# Patient Record
Sex: MALE | Race: WHITE
[De-identification: names, ages, dates, MRNs, and addresses within clinical notes are randomized per-mention and may not be internally consistent; named-entity substitution may affect disease eponyms.]

---

## 2018-06-04 ENCOUNTER — HOSPITAL ENCOUNTER (INPATIENT)
Dept: HOSPITAL 83 - ED | Age: 78
LOS: 2 days | Discharge: HOME HEALTH | DRG: 871 | End: 2018-06-06
Attending: INTERNAL MEDICINE | Admitting: INTERNAL MEDICINE
Payer: COMMERCIAL

## 2018-06-04 VITALS — DIASTOLIC BLOOD PRESSURE: 98 MMHG

## 2018-06-04 VITALS — BODY MASS INDEX: 29.59 KG/M2 | WEIGHT: 218.44 LBS | HEIGHT: 72 IN

## 2018-06-04 VITALS — DIASTOLIC BLOOD PRESSURE: 65 MMHG | SYSTOLIC BLOOD PRESSURE: 128 MMHG

## 2018-06-04 VITALS — DIASTOLIC BLOOD PRESSURE: 57 MMHG

## 2018-06-04 VITALS — DIASTOLIC BLOOD PRESSURE: 58 MMHG | SYSTOLIC BLOOD PRESSURE: 95 MMHG

## 2018-06-04 VITALS — DIASTOLIC BLOOD PRESSURE: 58 MMHG

## 2018-06-04 VITALS — DIASTOLIC BLOOD PRESSURE: 85 MMHG

## 2018-06-04 VITALS — DIASTOLIC BLOOD PRESSURE: 68 MMHG

## 2018-06-04 VITALS — DIASTOLIC BLOOD PRESSURE: 65 MMHG | SYSTOLIC BLOOD PRESSURE: 116 MMHG

## 2018-06-04 DIAGNOSIS — N17.1: ICD-10-CM

## 2018-06-04 DIAGNOSIS — L03.113: ICD-10-CM

## 2018-06-04 DIAGNOSIS — G93.41: ICD-10-CM

## 2018-06-04 DIAGNOSIS — E83.51: ICD-10-CM

## 2018-06-04 DIAGNOSIS — Z79.82: ICD-10-CM

## 2018-06-04 DIAGNOSIS — K76.0: ICD-10-CM

## 2018-06-04 DIAGNOSIS — A41.9: Primary | ICD-10-CM

## 2018-06-04 DIAGNOSIS — E78.1: ICD-10-CM

## 2018-06-04 DIAGNOSIS — E87.6: ICD-10-CM

## 2018-06-04 DIAGNOSIS — E83.39: ICD-10-CM

## 2018-06-04 DIAGNOSIS — R70.0: ICD-10-CM

## 2018-06-04 DIAGNOSIS — R74.0: ICD-10-CM

## 2018-06-04 DIAGNOSIS — E11.9: ICD-10-CM

## 2018-06-04 DIAGNOSIS — K76.9: ICD-10-CM

## 2018-06-04 DIAGNOSIS — E87.1: ICD-10-CM

## 2018-06-04 DIAGNOSIS — E44.0: ICD-10-CM

## 2018-06-04 DIAGNOSIS — Z79.899: ICD-10-CM

## 2018-06-04 DIAGNOSIS — E83.41: ICD-10-CM

## 2018-06-04 LAB
ALBUMIN SERPL-MCNC: 3.1 GM/DL (ref 3.1–4.5)
ALP SERPL-CCNC: 63 U/L (ref 45–117)
ALT SERPL W P-5'-P-CCNC: 45 U/L (ref 12–78)
APPEARANCE UR: (no result)
APTT PPP: 26.2 SECONDS (ref 20.8–31.5)
AST SERPL-CCNC: 53 IU/L (ref 3–35)
BASOPHILS # BLD AUTO: 0.1 10*3/UL (ref 0–0.1)
BASOPHILS NFR BLD AUTO: 0.8 % (ref 0–1)
BILIRUB UR QL STRIP: NEGATIVE
BUN SERPL-MCNC: 17 MG/DL (ref 7–24)
CHLORIDE SERPL-SCNC: 100 MMOL/L (ref 98–107)
COLOR UR: YELLOW
CREAT SERPL-MCNC: 0.99 MG/DL (ref 0.7–1.3)
EOSINOPHIL # BLD AUTO: 0 10*3/UL (ref 0–0.4)
EOSINOPHIL # BLD AUTO: 0.2 % (ref 1–4)
EPI CELLS #/AREA URNS HPF: (no result) /[HPF]
ERYTHROCYTE [DISTWIDTH] IN BLOOD BY AUTOMATED COUNT: 12.6 % (ref 0–14.5)
GLUCOSE UR QL: (no result)
HCT VFR BLD AUTO: 42.1 % (ref 42–52)
HGB BLD-MCNC: 14.4 G/DL (ref 14–18)
HGB UR QL STRIP: (no result)
INR BLD: 1.1 (ref 2–3.5)
KETONES UR QL STRIP: (no result)
LEUKOCYTE ESTERASE UR QL STRIP: NEGATIVE
LYMPHOCYTES # BLD AUTO: 0.6 10*3/UL (ref 1.3–4.4)
LYMPHOCYTES NFR BLD AUTO: 9.5 % (ref 27–41)
MCH RBC QN AUTO: 33.5 PG (ref 27–31)
MCHC RBC AUTO-ENTMCNC: 34.2 G/DL (ref 33–37)
MCV RBC AUTO: 97.9 FL (ref 80–94)
MONOCYTES # BLD AUTO: 0.6 10*3/UL (ref 0.1–1)
MONOCYTES NFR BLD MANUAL: 10.1 % (ref 3–9)
NEUT #: 5 10*3/UL (ref 2.3–7.9)
NEUT %: 79.1 % (ref 47–73)
NITRITE UR QL STRIP: NEGATIVE
NRBC BLD QL AUTO: 0 % (ref 0–0)
PH UR STRIP: 6 [PH] (ref 5–9)
PLATELET # BLD AUTO: 135 10*3/UL (ref 130–400)
PMV BLD AUTO: 10.6 FL (ref 9.6–12.3)
POTASSIUM SERPL-SCNC: 3.8 MMOL/L (ref 3.5–5.1)
PROT SERPL-MCNC: 6.8 GM/DL (ref 6.4–8.2)
RBC # BLD AUTO: 4.3 10*6/UL (ref 4.5–5.9)
RBC #/AREA URNS HPF: (no result) RBC/HPF (ref 0–2)
SODIUM SERPL-SCNC: 133 MMOL/L (ref 136–145)
SP GR UR: 1.02 (ref 1–1.03)
TROPONIN I SERPL-MCNC: < 0.015 NG/ML (ref ?–0.04)
UROBILINOGEN UR STRIP-MCNC: 2 E.U./DL (ref 0.2–1)
WBC #/AREA URNS HPF: (no result) WBC/HPF (ref 0–5)
WBC NRBC COR # BLD AUTO: 6.3 10*3/UL (ref 4.8–10.8)

## 2018-06-05 VITALS — DIASTOLIC BLOOD PRESSURE: 70 MMHG | SYSTOLIC BLOOD PRESSURE: 120 MMHG

## 2018-06-05 VITALS — DIASTOLIC BLOOD PRESSURE: 74 MMHG

## 2018-06-05 VITALS — DIASTOLIC BLOOD PRESSURE: 67 MMHG | SYSTOLIC BLOOD PRESSURE: 120 MMHG

## 2018-06-05 VITALS — DIASTOLIC BLOOD PRESSURE: 61 MMHG

## 2018-06-05 VITALS — DIASTOLIC BLOOD PRESSURE: 56 MMHG

## 2018-06-05 VITALS — DIASTOLIC BLOOD PRESSURE: 65 MMHG

## 2018-06-05 LAB
25(OH)D3 SERPL-MCNC: 25.2 NG/ML (ref 30–100)
ALBUMIN SERPL-MCNC: 3 GM/DL (ref 3.1–4.5)
ALP SERPL-CCNC: 84 U/L (ref 45–117)
ALT SERPL W P-5'-P-CCNC: 76 U/L (ref 12–78)
AST SERPL-CCNC: 112 IU/L (ref 3–35)
BASOPHILS # BLD AUTO: 0 10*3/UL (ref 0–0.1)
BASOPHILS NFR BLD AUTO: 0.5 % (ref 0–1)
BUN SERPL-MCNC: 19 MG/DL (ref 7–24)
CHLORIDE SERPL-SCNC: 98 MMOL/L (ref 98–107)
CHOLEST SERPL-MCNC: 82 MG/DL (ref ?–200)
CREAT SERPL-MCNC: 1.32 MG/DL (ref 0.7–1.3)
EOSINOPHIL # BLD AUTO: 0 10*3/UL (ref 0–0.4)
EOSINOPHIL # BLD AUTO: 0.3 % (ref 1–4)
ERYTHROCYTE [DISTWIDTH] IN BLOOD BY AUTOMATED COUNT: 12.7 % (ref 0–14.5)
HCT VFR BLD AUTO: 46.8 % (ref 42–52)
HDLC SERPL-MCNC: 17 MG/DL (ref 40–60)
HGB BLD-MCNC: 15.7 G/DL (ref 14–18)
LDLC SERPL DIRECT ASSAY-MCNC: 31 MG/DL (ref 9–159)
LYMPHOCYTES # BLD AUTO: 0.4 10*3/UL (ref 1.3–4.4)
LYMPHOCYTES NFR BLD AUTO: 6.5 % (ref 27–41)
MCH RBC QN AUTO: 33.8 PG (ref 27–31)
MCHC RBC AUTO-ENTMCNC: 33.5 G/DL (ref 33–37)
MCV RBC AUTO: 100.6 FL (ref 80–94)
MONOCYTES # BLD AUTO: 0.2 10*3/UL (ref 0.1–1)
MONOCYTES NFR BLD MANUAL: 3.9 % (ref 3–9)
NEUT #: 5.5 10*3/UL (ref 2.3–7.9)
NEUT %: 88.5 % (ref 47–73)
NRBC BLD QL AUTO: 0 % (ref 0–0)
PHOSPHATE SERPL-MCNC: 2.4 MG/DL (ref 2.5–4.9)
PLATELET # BLD AUTO: 124 10*3/UL (ref 130–400)
PMV BLD AUTO: 11 FL (ref 9.6–12.3)
POTASSIUM SERPL-SCNC: 3.2 MMOL/L (ref 3.5–5.1)
PROT SERPL-MCNC: 7.1 GM/DL (ref 6.4–8.2)
RBC # BLD AUTO: 4.65 10*6/UL (ref 4.5–5.9)
SODIUM SERPL-SCNC: 135 MMOL/L (ref 136–145)
T4 FREE SERPL-MCNC: 1.09 NG/DL (ref 0.76–1.46)
TRIGL SERPL-MCNC: 169 MG/DL (ref ?–150)
TSH SERPL DL<=0.005 MIU/L-ACNC: 1.91 UIU/ML (ref 0.36–4.75)
VITAMIN B12: 754 PG/ML (ref 247–911)
VLDLC SERPL CALC-MCNC: 34 MG/DL (ref 6–40)
WBC NRBC COR # BLD AUTO: 6.2 10*3/UL (ref 4.8–10.8)

## 2018-06-06 VITALS — DIASTOLIC BLOOD PRESSURE: 79 MMHG

## 2018-06-06 VITALS — SYSTOLIC BLOOD PRESSURE: 112 MMHG | DIASTOLIC BLOOD PRESSURE: 73 MMHG

## 2018-06-06 LAB
ALBUMIN SERPL-MCNC: 2.5 GM/DL (ref 3.1–4.5)
ALP SERPL-CCNC: 95 U/L (ref 45–117)
ALT SERPL W P-5'-P-CCNC: 80 U/L (ref 12–78)
AST SERPL-CCNC: 100 IU/L (ref 3–35)
BASOPHILS # BLD AUTO: 0 10*3/UL (ref 0–0.1)
BASOPHILS NFR BLD AUTO: 0.7 % (ref 0–1)
BUN SERPL-MCNC: 16 MG/DL (ref 7–24)
CHLORIDE SERPL-SCNC: 105 MMOL/L (ref 98–107)
CREAT SERPL-MCNC: 0.82 MG/DL (ref 0.7–1.3)
EOSINOPHIL # BLD AUTO: 0.1 10*3/UL (ref 0–0.4)
EOSINOPHIL # BLD AUTO: 1.4 % (ref 1–4)
ERYTHROCYTE [DISTWIDTH] IN BLOOD BY AUTOMATED COUNT: 13.1 % (ref 0–14.5)
HCT VFR BLD AUTO: 37.8 % (ref 42–52)
HGB BLD-MCNC: 12.7 G/DL (ref 14–18)
LYMPHOCYTES # BLD AUTO: 1 10*3/UL (ref 1.3–4.4)
LYMPHOCYTES NFR BLD AUTO: 22.7 % (ref 27–41)
MCH RBC QN AUTO: 33.3 PG (ref 27–31)
MCHC RBC AUTO-ENTMCNC: 33.6 G/DL (ref 33–37)
MCV RBC AUTO: 99.2 FL (ref 80–94)
MONOCYTES # BLD AUTO: 0.4 10*3/UL (ref 0.1–1)
MONOCYTES NFR BLD MANUAL: 9.5 % (ref 3–9)
NEUT #: 2.8 10*3/UL (ref 2.3–7.9)
NEUT %: 65.5 % (ref 47–73)
NRBC BLD QL AUTO: 0 10*3/UL (ref 0–0)
PLATELET # BLD AUTO: 136 10*3/UL (ref 130–400)
PMV BLD AUTO: 11.5 FL (ref 9.6–12.3)
POTASSIUM SERPL-SCNC: 3.7 MMOL/L (ref 3.5–5.1)
PROT SERPL-MCNC: 6 GM/DL (ref 6.4–8.2)
RBC # BLD AUTO: 3.81 10*6/UL (ref 4.5–5.9)
SODIUM SERPL-SCNC: 137 MMOL/L (ref 136–145)
WBC NRBC COR # BLD AUTO: 4.2 10*3/UL (ref 4.8–10.8)

## 2019-04-08 ENCOUNTER — HOSPITAL ENCOUNTER (OUTPATIENT)
Dept: HOSPITAL 83 - RAD | Age: 79
Discharge: HOME | End: 2019-04-08
Attending: FAMILY MEDICINE
Payer: COMMERCIAL

## 2019-04-08 DIAGNOSIS — M16.12: Primary | ICD-10-CM

## 2019-04-19 ENCOUNTER — HOSPITAL ENCOUNTER (OUTPATIENT)
Dept: HOSPITAL 83 - MRI | Age: 79
Discharge: HOME | End: 2019-04-19
Attending: FAMILY MEDICINE
Payer: COMMERCIAL

## 2019-04-19 DIAGNOSIS — R60.0: ICD-10-CM

## 2019-04-19 DIAGNOSIS — M25.461: ICD-10-CM
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
## 2019-04-19 DIAGNOSIS — M17.12: ICD-10-CM

## 2019-04-19 DIAGNOSIS — M25.462: ICD-10-CM
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
## 2019-04-19 DIAGNOSIS — S76.111A: Primary | ICD-10-CM
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
## 2019-04-19 DIAGNOSIS — R26.2: ICD-10-CM

## 2019-04-30 ENCOUNTER — HOSPITAL ENCOUNTER (OUTPATIENT)
Dept: HOSPITAL 83 - ORTHO | Age: 79
Discharge: HOME | End: 2019-04-30
Attending: ORTHOPAEDIC SURGERY
Payer: COMMERCIAL

## 2019-04-30 DIAGNOSIS — M17.12: Primary | ICD-10-CM

## 2019-07-10 ENCOUNTER — HOSPITAL ENCOUNTER (OUTPATIENT)
Dept: HOSPITAL 83 - ORTHO | Age: 79
Discharge: HOME | End: 2019-07-10
Attending: ORTHOPAEDIC SURGERY
Payer: COMMERCIAL

## 2019-07-10 DIAGNOSIS — Z91.81: ICD-10-CM

## 2019-07-10 DIAGNOSIS — M17.12: Primary | ICD-10-CM

## 2019-10-01 ENCOUNTER — HOSPITAL ENCOUNTER (EMERGENCY)
Dept: HOSPITAL 83 - ED | Age: 79
Discharge: HOME | End: 2019-10-01
Payer: COMMERCIAL

## 2019-10-01 VITALS — BODY MASS INDEX: 29.12 KG/M2 | HEIGHT: 71.97 IN | WEIGHT: 215 LBS

## 2019-10-01 DIAGNOSIS — E11.9: ICD-10-CM
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
## 2019-10-01 DIAGNOSIS — Z79.899: ICD-10-CM

## 2019-10-01 DIAGNOSIS — Z79.82: ICD-10-CM
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
## 2019-10-01 DIAGNOSIS — M25.532: Primary | ICD-10-CM

## 2019-10-07 ENCOUNTER — HOSPITAL ENCOUNTER (OUTPATIENT)
Dept: HOSPITAL 83 - RAD | Age: 79
Discharge: HOME | End: 2019-10-07
Attending: FAMILY MEDICINE
Payer: COMMERCIAL

## 2019-10-07 DIAGNOSIS — M25.532: Primary | ICD-10-CM

## 2019-10-07 DIAGNOSIS — M79.645: ICD-10-CM

## 2019-10-22 ENCOUNTER — HOSPITAL ENCOUNTER (INPATIENT)
Dept: HOSPITAL 83 - ED | Age: 79
LOS: 3 days | Discharge: HOME | DRG: 579 | End: 2019-10-25
Attending: INTERNAL MEDICINE | Admitting: INTERNAL MEDICINE
Payer: COMMERCIAL

## 2019-10-22 VITALS — DIASTOLIC BLOOD PRESSURE: 74 MMHG | SYSTOLIC BLOOD PRESSURE: 148 MMHG

## 2019-10-22 VITALS — DIASTOLIC BLOOD PRESSURE: 85 MMHG | WEIGHT: 218.31 LBS | HEIGHT: 72 IN | BODY MASS INDEX: 29.57 KG/M2

## 2019-10-22 VITALS — SYSTOLIC BLOOD PRESSURE: 156 MMHG | DIASTOLIC BLOOD PRESSURE: 84 MMHG

## 2019-10-22 DIAGNOSIS — E11.621: ICD-10-CM

## 2019-10-22 DIAGNOSIS — D53.9: ICD-10-CM

## 2019-10-22 DIAGNOSIS — E11.65: ICD-10-CM

## 2019-10-22 DIAGNOSIS — L03.032: Primary | ICD-10-CM

## 2019-10-22 DIAGNOSIS — E43: ICD-10-CM

## 2019-10-22 DIAGNOSIS — E11.42: ICD-10-CM

## 2019-10-22 DIAGNOSIS — L97.529: ICD-10-CM

## 2019-10-22 DIAGNOSIS — I10: ICD-10-CM

## 2019-10-22 DIAGNOSIS — E83.41: ICD-10-CM

## 2019-10-22 LAB
ALBUMIN SERPL-MCNC: 2.4 GM/DL (ref 3.1–4.5)
ALP SERPL-CCNC: 88 U/L (ref 45–117)
ALT SERPL W P-5'-P-CCNC: 21 U/L (ref 12–78)
APTT PPP: 25.4 SECONDS (ref 20–32.1)
AST SERPL-CCNC: 17 IU/L (ref 3–35)
BASOPHILS # BLD AUTO: 0.1 10*3/UL (ref 0–0.1)
BASOPHILS NFR BLD AUTO: 1.3 % (ref 0–1)
BUN SERPL-MCNC: 17 MG/DL (ref 7–24)
CHLORIDE SERPL-SCNC: 104 MMOL/L (ref 98–107)
CREAT SERPL-MCNC: 0.9 MG/DL (ref 0.7–1.3)
EOSINOPHIL # BLD AUTO: 0.2 10*3/UL (ref 0–0.4)
EOSINOPHIL # BLD AUTO: 3 % (ref 1–4)
ERYTHROCYTE [DISTWIDTH] IN BLOOD BY AUTOMATED COUNT: 12.2 % (ref 0–14.5)
HCT VFR BLD AUTO: 42.1 % (ref 42–52)
HGB BLD-MCNC: 13.9 G/DL (ref 14–18)
INR BLD: 1 (ref 2–3.5)
LIPASE SERPL-CCNC: 118 U/L (ref 73–393)
LYMPHOCYTES # BLD AUTO: 1.8 10*3/UL (ref 1.3–4.4)
LYMPHOCYTES NFR BLD AUTO: 25.4 % (ref 27–41)
MCH RBC QN AUTO: 34 PG (ref 27–31)
MCHC RBC AUTO-ENTMCNC: 33 G/DL (ref 33–37)
MCV RBC AUTO: 102.9 FL (ref 80–94)
MONOCYTES # BLD AUTO: 0.5 10*3/UL (ref 0.1–1)
MONOCYTES NFR BLD MANUAL: 6.7 % (ref 3–9)
NEUT #: 4.4 10*3/UL (ref 2.3–7.9)
NEUT %: 62.6 % (ref 47–73)
NRBC BLD QL AUTO: 0 10*3/UL (ref 0–0)
PLATELET # BLD AUTO: 213 10*3/UL (ref 130–400)
PMV BLD AUTO: 9.7 FL (ref 9.6–12.3)
POTASSIUM SERPL-SCNC: 4 MMOL/L (ref 3.5–5.1)
PROT SERPL-MCNC: 6.9 GM/DL (ref 6.4–8.2)
RBC # BLD AUTO: 4.09 10*6/UL (ref 4.5–5.9)
SODIUM SERPL-SCNC: 136 MMOL/L (ref 136–145)
TROPONIN I SERPL-MCNC: < 0.015 NG/ML (ref ?–0.04)
WBC NRBC COR # BLD AUTO: 7 10*3/UL (ref 4.8–10.8)

## 2019-10-22 NOTE — NUR
DR. CHA'S ANSWERING SERVICE NOTIFIED OF CONSULT.
PODIATRY AWARE OF CONSULT, HAS SEEN THE PATIENT IN THE ED, AND HAS ENTERED
ORDERS FOR SURGERY TO LEFT TOE THURSDAY (I & D)

## 2019-10-22 NOTE — NUR
2000 ZOSYN NOT AVAILABLE FROM PHARMACY. SPOKE WITH PHARMACIST MULTIPLE TIMES.
STATES HE WILL GET IT UP

## 2019-10-22 NOTE — NUR
Time: 1620
A 79  year old MALE admitted to 5E
under services of LUCIE HUSSEIN DO.
Pt. arrived via stretcher from
ER.  Chief complaint: RIGHT 2ND GREAT TOE REDNESS WITH DRAINING WOUND.
 
TRUDY SILVERIO

## 2019-10-23 VITALS — DIASTOLIC BLOOD PRESSURE: 84 MMHG

## 2019-10-23 VITALS — DIASTOLIC BLOOD PRESSURE: 67 MMHG | SYSTOLIC BLOOD PRESSURE: 121 MMHG

## 2019-10-23 VITALS — DIASTOLIC BLOOD PRESSURE: 59 MMHG | SYSTOLIC BLOOD PRESSURE: 103 MMHG

## 2019-10-23 VITALS — DIASTOLIC BLOOD PRESSURE: 53 MMHG

## 2019-10-23 VITALS — DIASTOLIC BLOOD PRESSURE: 77 MMHG

## 2019-10-23 LAB
25(OH)D3 SERPL-MCNC: 30 NG/ML (ref 30–100)
ALBUMIN SERPL-MCNC: 2.2 GM/DL (ref 3.1–4.5)
ALP SERPL-CCNC: 73 U/L (ref 45–117)
ALT SERPL W P-5'-P-CCNC: 21 U/L (ref 12–78)
APTT PPP: 26.3 SECONDS (ref 20–32.1)
AST SERPL-CCNC: 18 IU/L (ref 3–35)
BASOPHILS # BLD AUTO: 0.1 10*3/UL (ref 0–0.1)
BASOPHILS NFR BLD AUTO: 0.9 % (ref 0–1)
BUN SERPL-MCNC: 13 MG/DL (ref 7–24)
CHLORIDE SERPL-SCNC: 107 MMOL/L (ref 98–107)
CHOLEST SERPL-MCNC: 102 MG/DL (ref ?–200)
CREAT SERPL-MCNC: 0.94 MG/DL (ref 0.7–1.3)
EOSINOPHIL # BLD AUTO: 0.2 10*3/UL (ref 0–0.4)
EOSINOPHIL # BLD AUTO: 2.1 % (ref 1–4)
ERYTHROCYTE [DISTWIDTH] IN BLOOD BY AUTOMATED COUNT: 12 % (ref 0–14.5)
HCT VFR BLD AUTO: 39.5 % (ref 42–52)
HDLC SERPL-MCNC: 24 MG/DL (ref 40–60)
HGB BLD-MCNC: 12.9 G/DL (ref 14–18)
INR BLD: 1.1 (ref 2–3.5)
LDLC SERPL DIRECT ASSAY-MCNC: 54 MG/DL (ref 9–159)
LYMPHOCYTES # BLD AUTO: 2.2 10*3/UL (ref 1.3–4.4)
LYMPHOCYTES NFR BLD AUTO: 23.7 % (ref 27–41)
MCH RBC QN AUTO: 33.2 PG (ref 27–31)
MCHC RBC AUTO-ENTMCNC: 32.7 G/DL (ref 33–37)
MCV RBC AUTO: 101.8 FL (ref 80–94)
MONOCYTES # BLD AUTO: 0.5 10*3/UL (ref 0.1–1)
MONOCYTES NFR BLD MANUAL: 4.8 % (ref 3–9)
NEUT #: 6.4 10*3/UL (ref 2.3–7.9)
NEUT %: 68 % (ref 47–73)
NRBC BLD QL AUTO: 0 % (ref 0–0)
PHOSPHATE SERPL-MCNC: 2.9 MG/DL (ref 2.5–4.9)
PLATELET # BLD AUTO: 225 10*3/UL (ref 130–400)
PMV BLD AUTO: 9.9 FL (ref 9.6–12.3)
POTASSIUM SERPL-SCNC: 4.1 MMOL/L (ref 3.5–5.1)
PROT SERPL-MCNC: 6.3 GM/DL (ref 6.4–8.2)
RBC # BLD AUTO: 3.88 10*6/UL (ref 4.5–5.9)
SODIUM SERPL-SCNC: 138 MMOL/L (ref 136–145)
T4 FREE SERPL-MCNC: 0.85 NG/DL (ref 0.76–1.46)
TRIGL SERPL-MCNC: 122 MG/DL (ref ?–150)
TSH SERPL DL<=0.005 MIU/L-ACNC: 2.53 UIU/ML (ref 0.36–4.75)
VITAMIN B12: 369 PG/ML (ref 247–911)
VLDLC SERPL CALC-MCNC: 24 MG/DL (ref 6–40)
WBC NRBC COR # BLD AUTO: 9.3 10*3/UL (ref 4.8–10.8)

## 2019-10-23 NOTE — NUR
BOSWORTH,ARTHUR H [id number]
 
Please refer to the physician's history and physical for past medical history,
comorbid conditions, and allergies.
   Diagnosis: INFECTED OPEN WOUND   CELLULITIS OF SECOND TOE LEF
 
   Claude Score: 21,LOW OR NO RISK
 
WOUND DESCRIPTIONS:
Wound Number: 1
Location of the wound: left 2nd toe
Thickness: Full
Size: 1.1cm x 1.1cm x 0.3cm
Tunneling: none
Undermining: none
Sinus Tract: none
Presence of Exudate: Serosanguineous
Amount: Light
Color: Red, brown, yellow
Odor: None
Periwound Skin Appearance: Erythema
Wound edges: approximated
Pain (associated with wound): none at time of assessment
How does patient state this happened? pt stated this started about one month
ago while he was cutting his toenails he stated that his wife typically cuts
his toenails but she was not around and cut them and caused this area
   Surface the patient is resting on: Position Pro
 
SKIN PREVENTION RECOMMENDATION:
   1.  Pressure redistribution support surface as appropriate
   2.  Elevate heels
   3.  Remove boots/TEDS every shift and reapply
   4.  Head of bed 30 degrees as tolerated
   5.  Assess nutrition and hydration
   6.  Manage moisture
   7.  Avoid the use of containment devices while in bed
   8.  Use absorptive products on surfaces limit layers of linens on bed
   9.  Turn and reposition every 1-2 hours in bed and every 1 hour in chair as
       tolerated
   10. Weight shifts every 15 minutes while up in chair
   11. Offloading with pillows or device to keep heels elevated off bed
   12. Monitor skin at least every shift
   13. Inspect under medical devices twice a day
 
WOUND TREATMENT RECOMMENDATIONS:
Continue bactroan ointment per podiatry orders. Podiatry and ID are on consult
and patient is having procedure thursday.

## 2019-10-23 NOTE — NUR
in to talk to patient.
Patient states lives at home with wife.
There are few steps in the home.
Physician: lexie mack
Pharmacy: [facility]
[place] [facility]: none
Patient's level of ADLs: INDEPENDENT
Patient has working utilities: all working
DME: none
Follow-up physician's appointment after d/c: will be made by hospitalist nurse
director upon discharge
Does patient want to access PORTAL?: no
Discharge plan discussed with patient and wife, he lives at home with wife, is
independent in adls and ambulation, drives, he states he would like to return
home when medically stable and will decide if he needs anything according to
the treatment performed. wife is in agreement with this, case management will
follow.
 
VALERIA GARCIA

## 2019-10-23 NOTE — NUR
PHYSICAL THERAPY
 
Physical therapy evaluation completed, 5E. Full details to follow. moderate
complexity determined after chart review and evaluation, [id number]. PT will work
on gait/transfers while maintaining NWB status, balance and safety.
Recommending home health versus SNF at discharge. Thank you
 
Marine Andrews, PT, DPT

## 2019-10-23 NOTE — NUR
Occupational therapy orders received and OT evaluation completed in full on
floor five. Patient precautions include fall risk, ww use, IV lines, and NWB L
LE. Per OT eval and POC, OT recommends SNF. If refused, home with HH SN, OT,
and PT. Patient would benefit fromc continued OT treatment to maximize
independence and safety with ADLs and functional mobility/transfers. Patient
complexity is low, [id number]. Thank you.
 
Indira Wong, OTR/L

## 2019-10-24 VITALS — DIASTOLIC BLOOD PRESSURE: 66 MMHG | SYSTOLIC BLOOD PRESSURE: 113 MMHG

## 2019-10-24 VITALS — DIASTOLIC BLOOD PRESSURE: 70 MMHG | SYSTOLIC BLOOD PRESSURE: 123 MMHG

## 2019-10-24 VITALS — SYSTOLIC BLOOD PRESSURE: 113 MMHG | DIASTOLIC BLOOD PRESSURE: 48 MMHG

## 2019-10-24 VITALS — DIASTOLIC BLOOD PRESSURE: 69 MMHG | SYSTOLIC BLOOD PRESSURE: 113 MMHG

## 2019-10-24 VITALS — DIASTOLIC BLOOD PRESSURE: 71 MMHG

## 2019-10-24 VITALS — DIASTOLIC BLOOD PRESSURE: 66 MMHG

## 2019-10-24 VITALS — DIASTOLIC BLOOD PRESSURE: 77 MMHG

## 2019-10-24 VITALS — DIASTOLIC BLOOD PRESSURE: 89 MMHG

## 2019-10-24 VITALS — DIASTOLIC BLOOD PRESSURE: 78 MMHG

## 2019-10-24 PROCEDURE — 0QBR0ZX EXCISION OF LEFT TOE PHALANX, OPEN APPROACH, DIAGNOSTIC: ICD-10-PCS | Performed by: PODIATRIST

## 2019-10-24 NOTE — NUR
case management received a script for a walker for when patient is discharged
to home, sent script to [facility],

## 2019-10-24 NOTE — NUR
case management attempts to visit with patient, he is out of room for
procedure, case management will follow

## 2019-10-24 NOTE — NUR
PHYSICAL THERAPY
 
Patient seen this am 1;1 for therapy visit and was resting supine in bed upon
therapist arrival. Patient identified by name /  and presented with
continuos IV treatment. Patient voices no c/o's pain and is NWB on L LE while
receiving safe transfer education. Patient transfers supine to sit EOB SBA x 1
and sit to stand, MIN A with use of wh walker standing support. Patient
performed several 90 degree SPT each direction, CGA, wh walker, demonstrating
a little initial difficulty, needing v/c to improve safe performance. Patient
improved technique / performance following several trials with increased
confidence and returned to supine in bed. Patient remained with call light,
tray table and telephone. Will continue per POC as tolerated, total treatment
time 14 minutes.  Aung Guzman, PTA

## 2019-10-24 NOTE — NUR
DURING ASSESSMENT, PTS LT FOOT DRESSING WAS NOTED DRY & INTACT. NO COMPLAINTS
OF PAIN FROM THE PT AT THIS TIME. PT ABLE TO MOVE FOOT/TOES WITHOUT
COMPLICATION. LEFT FOOT ELEVATED ON PILLOW AT THIS TIME. WILL
CONTINUE TO MONITOR.

## 2019-10-24 NOTE — NUR
PATIENT RELAXED AT THIS TIME, NO COMPLAINTS. DENIES PAIN. DRESSING DRY &
INTACT TO LEFT FOOT. CALL LIGHT WITHIN REACH.

## 2019-10-24 NOTE — NUR
OT NOTE
Pt was seen this A.M. 1:1 for 16 minute OT session. Upon arrival pt was supine
in bed. Pt identified by name and  and had no complaints at this time. Pt
was educated on NWB status of LLE and pt verbalized understanding. Pt
transferred supine to sit EOB with SBA. Pt then completed multiple sit to
stand transfers from bed level with Sivakumar and use of w/w. Pt was educated on
slowing down throughout due to being impulsive and increasing risk of falls.
Challenged pt's static standing tolerance needed for increased I in self care
tasks and functional transfers and for proper follow through of non weight
bearing to LLE. Throughout pt was able to tolerate aprox 2 minutes of static
standing tolerance before sitting due to fatigue. Pt was 100% compliant with
non weight bearing with constant verbal prompts to maintain. Pt had three LOB
throughout standing to the R that required Sivakumar to correct.  Attempted to
complete functional mobility to the bathroom and back and pt stated he was too
tired at this time and didn't feel comfortable with weight bearing status. Pt
then transferred sit to supine with SBA. There he was left with call light in
hand, tray table in place, and phone in reach. Continue with rec D/C plan to
SNF.
 
ANA Shay/L

## 2019-10-25 VITALS — DIASTOLIC BLOOD PRESSURE: 76 MMHG

## 2019-10-25 VITALS — SYSTOLIC BLOOD PRESSURE: 146 MMHG | DIASTOLIC BLOOD PRESSURE: 77 MMHG

## 2019-10-25 LAB
BASOPHILS # BLD AUTO: 0.1 10*3/UL (ref 0–0.1)
BASOPHILS NFR BLD AUTO: 1.1 % (ref 0–1)
EOSINOPHIL # BLD AUTO: 0.3 10*3/UL (ref 0–0.4)
EOSINOPHIL # BLD AUTO: 4.1 % (ref 1–4)
ERYTHROCYTE [DISTWIDTH] IN BLOOD BY AUTOMATED COUNT: 12.3 % (ref 0–14.5)
HCT VFR BLD AUTO: 41 % (ref 42–52)
HGB BLD-MCNC: 13.5 G/DL (ref 14–18)
LYMPHOCYTES # BLD AUTO: 2.4 10*3/UL (ref 1.3–4.4)
LYMPHOCYTES NFR BLD AUTO: 33.7 % (ref 27–41)
MCH RBC QN AUTO: 33.8 PG (ref 27–31)
MCHC RBC AUTO-ENTMCNC: 32.9 G/DL (ref 33–37)
MCV RBC AUTO: 102.8 FL (ref 80–94)
MONOCYTES # BLD AUTO: 0.6 10*3/UL (ref 0.1–1)
MONOCYTES NFR BLD MANUAL: 8.4 % (ref 3–9)
NEUT #: 3.7 10*3/UL (ref 2.3–7.9)
NEUT %: 51.6 % (ref 47–73)
NRBC BLD QL AUTO: 0 % (ref 0–0)
PLATELET # BLD AUTO: 244 10*3/UL (ref 130–400)
PMV BLD AUTO: 9.6 FL (ref 9.6–12.3)
RBC # BLD AUTO: 3.99 10*6/UL (ref 4.5–5.9)
WBC NRBC COR # BLD AUTO: 7.1 10*3/UL (ref 4.8–10.8)

## 2019-10-25 NOTE — NUR
OT NOTE
Pt was seen this A.M. 1:1 for 12 minute OT session. Upon arrival pt was supine
in bed. Pt identified by name and  and had no complaints at this time. Pt
transferred supine to sit EOB with SBA. Sit to stand then completed from the
bed level with Sivakumar and use of w/w for UE support. During rise pt had LOB to
the R that required Sivakumar to correct. Pt maintained non weight bearing status
to LLE with one verbal prompt provided. Functional mobility was then completed
to the bathroom and back with Sivakumar and use of w/w with bouts of unsteady due
to being impulsive and increasing risk of falls. Pt maintained non weight
bearing throughout mobility with 100% accuracy. Pt transferred back into bed
sit to supine with SBA. There he was left with call light in hand, tray table
in place, and wife at bedside. Continue with rec D/C plan to SNF.
 
ANA Shay/L

## 2019-10-25 NOTE — NUR
Nutritional Support Services Note: Appetite is good for meals, he is eating
100% of 1800cal diet as ordered. Will follow as needed. Marlene Farias Rdn Ld

## 2019-10-25 NOTE — NUR
PHYSICAL THERAPY CO-SIGN
 
 
  I approve of the Physical Therapy notes written above.
 
                                  NEGRO JENSEN, PT, DPT

## 2019-10-25 NOTE — NUR
OCCUPATIONAL THERAPY CO-SIGN
 
I approve of the Occupational Therapy notes written above.
SUNG BEYER OTR/BRAYDON

## 2019-10-25 NOTE — NUR
case management visits with patient, wife present, wife stated health care
solutions contacted her yesterday regarding the walker and they will deliver
it to the hospital today, also discussed with them VNA and both declined any
home services, wife stated she will be taking patient to Dr Palencia's office on
Tuesday of next week

## 2019-10-26 LAB — SPECIMEN PREPARATION: (no result)

## 2019-11-04 ENCOUNTER — HOSPITAL ENCOUNTER (OUTPATIENT)
Dept: HOSPITAL 83 - PICC | Age: 79
Discharge: HOME | End: 2019-11-04
Attending: STUDENT IN AN ORGANIZED HEALTH CARE EDUCATION/TRAINING PROGRAM
Payer: COMMERCIAL

## 2019-11-04 VITALS — SYSTOLIC BLOOD PRESSURE: 143 MMHG | DIASTOLIC BLOOD PRESSURE: 69 MMHG

## 2019-11-04 DIAGNOSIS — M86.272: Primary | ICD-10-CM

## 2019-11-04 DIAGNOSIS — Z82.49: ICD-10-CM

## 2019-11-04 DIAGNOSIS — Z83.3: ICD-10-CM

## 2020-09-20 ENCOUNTER — HOSPITAL ENCOUNTER (EMERGENCY)
Dept: HOSPITAL 83 - ED | Age: 80
Discharge: HOME | End: 2020-09-20
Payer: COMMERCIAL

## 2020-09-20 VITALS — BODY MASS INDEX: 29.12 KG/M2 | WEIGHT: 215 LBS | HEIGHT: 71.97 IN

## 2020-09-20 DIAGNOSIS — Z79.82: ICD-10-CM

## 2020-09-20 DIAGNOSIS — Z79.899: ICD-10-CM

## 2020-09-20 DIAGNOSIS — S20.212A: Primary | ICD-10-CM

## 2020-09-20 DIAGNOSIS — Z79.2: ICD-10-CM
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
## 2020-09-20 DIAGNOSIS — I10: ICD-10-CM
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
## 2020-10-04 ENCOUNTER — HOSPITAL ENCOUNTER (EMERGENCY)
Dept: HOSPITAL 83 - ED | Age: 80
Discharge: HOME | End: 2020-10-04
Payer: COMMERCIAL

## 2020-10-04 VITALS — WEIGHT: 215 LBS | HEIGHT: 70.98 IN | BODY MASS INDEX: 30.1 KG/M2

## 2020-10-04 DIAGNOSIS — R07.89: ICD-10-CM

## 2020-10-04 DIAGNOSIS — I70.0: ICD-10-CM

## 2020-10-04 DIAGNOSIS — Z79.899: ICD-10-CM

## 2020-10-04 DIAGNOSIS — I25.84: Primary | ICD-10-CM

## 2020-10-04 DIAGNOSIS — Z79.82: ICD-10-CM

## 2020-10-04 LAB
ALBUMIN SERPL-MCNC: 3.4 GM/DL (ref 3.1–4.5)
ALP SERPL-CCNC: 197 U/L (ref 45–117)
ALT SERPL W P-5'-P-CCNC: 38 U/L (ref 12–78)
APTT PPP: 25 SECONDS (ref 20–32.1)
AST SERPL-CCNC: 32 IU/L (ref 3–35)
BASOPHILS # BLD AUTO: 0.1 10*3/UL (ref 0–0.1)
BASOPHILS NFR BLD AUTO: 1.6 % (ref 0–1)
BUN SERPL-MCNC: 14 MG/DL (ref 7–24)
CHLORIDE SERPL-SCNC: 106 MMOL/L (ref 98–107)
CREAT SERPL-MCNC: 0.94 MG/DL (ref 0.7–1.3)
EOSINOPHIL # BLD AUTO: 0.2 10*3/UL (ref 0–0.4)
EOSINOPHIL # BLD AUTO: 2.5 % (ref 1–4)
ERYTHROCYTE [DISTWIDTH] IN BLOOD BY AUTOMATED COUNT: 12.2 % (ref 0–14.5)
HCT VFR BLD AUTO: 46.6 % (ref 42–52)
INR BLD: 1 (ref 2–3.5)
LYMPHOCYTES # BLD AUTO: 2.1 10*3/UL (ref 1.3–4.4)
LYMPHOCYTES NFR BLD AUTO: 27 % (ref 27–41)
MCH RBC QN AUTO: 33 PG (ref 27–31)
MCHC RBC AUTO-ENTMCNC: 32.8 G/DL (ref 33–37)
MCV RBC AUTO: 100.4 FL (ref 80–94)
MONOCYTES # BLD AUTO: 0.6 10*3/UL (ref 0.1–1)
MONOCYTES NFR BLD MANUAL: 8 % (ref 3–9)
NEUT #: 4.6 10*3/UL (ref 2.3–7.9)
NEUT %: 60.5 % (ref 47–73)
NRBC BLD QL AUTO: 0 % (ref 0–0)
PLATELET # BLD AUTO: 226 10*3/UL (ref 130–400)
PMV BLD AUTO: 10 FL (ref 9.6–12.3)
POTASSIUM SERPL-SCNC: 4.3 MMOL/L (ref 3.5–5.1)
PROT SERPL-MCNC: 7.5 GM/DL (ref 6.4–8.2)
RBC # BLD AUTO: 4.64 10*6/UL (ref 4.5–5.9)
SODIUM SERPL-SCNC: 135 MMOL/L (ref 136–145)
TROPONIN I SERPL-MCNC: < 0.015 NG/ML (ref ?–0.04)
WBC NRBC COR # BLD AUTO: 7.6 10*3/UL (ref 4.8–10.8)

## 2021-02-08 ENCOUNTER — HOSPITAL ENCOUNTER (OUTPATIENT)
Dept: HOSPITAL 83 - COVID19 | Age: 81
Discharge: HOME | End: 2021-02-08
Payer: COMMERCIAL

## 2021-02-08 DIAGNOSIS — Z01.812: Primary | ICD-10-CM

## 2021-02-08 DIAGNOSIS — Z20.822: ICD-10-CM

## 2022-01-01 ENCOUNTER — APPOINTMENT (OUTPATIENT)
Dept: CT IMAGING | Age: 82
DRG: 435 | End: 2022-01-01
Attending: FAMILY MEDICINE
Payer: MEDICARE

## 2022-01-01 ENCOUNTER — APPOINTMENT (OUTPATIENT)
Dept: NUCLEAR MEDICINE | Age: 82
DRG: 435 | End: 2022-01-01
Attending: FAMILY MEDICINE
Payer: MEDICARE

## 2022-01-01 ENCOUNTER — HOSPITAL ENCOUNTER (INPATIENT)
Age: 82
LOS: 1 days | DRG: 435 | End: 2022-08-01
Attending: FAMILY MEDICINE | Admitting: FAMILY MEDICINE
Payer: MEDICARE

## 2022-01-01 VITALS
TEMPERATURE: 97.1 F | RESPIRATION RATE: 17 BRPM | HEART RATE: 95 BPM | DIASTOLIC BLOOD PRESSURE: 54 MMHG | HEIGHT: 71 IN | WEIGHT: 150 LBS | OXYGEN SATURATION: 73 % | BODY MASS INDEX: 21 KG/M2 | SYSTOLIC BLOOD PRESSURE: 83 MMHG

## 2022-01-01 LAB
ALBUMIN SERPL-MCNC: 2.5 G/DL (ref 3.5–5.2)
ALBUMIN SERPL-MCNC: 2.9 G/DL (ref 3.5–5.2)
ALP BLD-CCNC: 396 U/L (ref 40–129)
ALP BLD-CCNC: 468 U/L (ref 40–129)
ALT SERPL-CCNC: 191 U/L (ref 0–40)
ALT SERPL-CCNC: 197 U/L (ref 0–40)
ANION GAP SERPL CALCULATED.3IONS-SCNC: 17 MMOL/L (ref 7–16)
ANION GAP SERPL CALCULATED.3IONS-SCNC: 19 MMOL/L (ref 7–16)
APTT: 112.1 SEC (ref 24.5–35.1)
APTT: 37.9 SEC (ref 24.5–35.1)
AST SERPL-CCNC: 371 U/L (ref 0–39)
AST SERPL-CCNC: 462 U/L (ref 0–39)
BASOPHILS ABSOLUTE: 0.03 E9/L (ref 0–0.2)
BASOPHILS ABSOLUTE: 0.03 E9/L (ref 0–0.2)
BASOPHILS RELATIVE PERCENT: 0.2 % (ref 0–2)
BASOPHILS RELATIVE PERCENT: 0.2 % (ref 0–2)
BILIRUB SERPL-MCNC: 4.5 MG/DL (ref 0–1.2)
BILIRUB SERPL-MCNC: 4.7 MG/DL (ref 0–1.2)
BILIRUBIN DIRECT: 3.2 MG/DL (ref 0–0.3)
BILIRUBIN, INDIRECT: 1.5 MG/DL (ref 0–1)
BUN BLDV-MCNC: 34 MG/DL (ref 6–23)
BUN BLDV-MCNC: 38 MG/DL (ref 6–23)
CALCIUM SERPL-MCNC: 8.2 MG/DL (ref 8.6–10.2)
CALCIUM SERPL-MCNC: 8.4 MG/DL (ref 8.6–10.2)
CHLORIDE BLD-SCNC: 101 MMOL/L (ref 98–107)
CHLORIDE BLD-SCNC: 105 MMOL/L (ref 98–107)
CO2: 15 MMOL/L (ref 22–29)
CO2: 21 MMOL/L (ref 22–29)
CREAT SERPL-MCNC: 1.1 MG/DL (ref 0.7–1.2)
CREAT SERPL-MCNC: 1.2 MG/DL (ref 0.7–1.2)
D DIMER: >5250 NG/ML DDU
EKG ATRIAL RATE: 142 BPM
EKG P-R INTERVAL: 166 MS
EKG Q-T INTERVAL: 266 MS
EKG QRS DURATION: 68 MS
EKG QTC CALCULATION (BAZETT): 409 MS
EKG R AXIS: -33 DEGREES
EKG T AXIS: -124 DEGREES
EKG VENTRICULAR RATE: 142 BPM
EOSINOPHILS ABSOLUTE: 0 E9/L (ref 0.05–0.5)
EOSINOPHILS ABSOLUTE: 0.03 E9/L (ref 0.05–0.5)
EOSINOPHILS RELATIVE PERCENT: 0 % (ref 0–6)
EOSINOPHILS RELATIVE PERCENT: 0.2 % (ref 0–6)
GFR AFRICAN AMERICAN: >60
GFR AFRICAN AMERICAN: >60
GFR NON-AFRICAN AMERICAN: 58 ML/MIN/1.73
GFR NON-AFRICAN AMERICAN: >60 ML/MIN/1.73
GLUCOSE BLD-MCNC: 103 MG/DL (ref 74–99)
GLUCOSE BLD-MCNC: 99 MG/DL (ref 74–99)
HCT VFR BLD CALC: 40.4 % (ref 37–54)
HCT VFR BLD CALC: 42.2 % (ref 37–54)
HEMOGLOBIN: 13.6 G/DL (ref 12.5–16.5)
HEMOGLOBIN: 14.2 G/DL (ref 12.5–16.5)
IMMATURE GRANULOCYTES #: 0.11 E9/L
IMMATURE GRANULOCYTES #: 0.13 E9/L
IMMATURE GRANULOCYTES %: 0.8 % (ref 0–5)
IMMATURE GRANULOCYTES %: 1 % (ref 0–5)
L. PNEUMOPHILA SEROGP 1 UR AG: NORMAL
LACTIC ACID: 2.9 MMOL/L (ref 0.5–2.2)
LYMPHOCYTES ABSOLUTE: 1 E9/L (ref 1.5–4)
LYMPHOCYTES ABSOLUTE: 1.82 E9/L (ref 1.5–4)
LYMPHOCYTES RELATIVE PERCENT: 13.6 % (ref 20–42)
LYMPHOCYTES RELATIVE PERCENT: 7.6 % (ref 20–42)
MCH RBC QN AUTO: 35.4 PG (ref 26–35)
MCH RBC QN AUTO: 35.9 PG (ref 26–35)
MCHC RBC AUTO-ENTMCNC: 33.6 % (ref 32–34.5)
MCHC RBC AUTO-ENTMCNC: 33.7 % (ref 32–34.5)
MCV RBC AUTO: 105.2 FL (ref 80–99.9)
MCV RBC AUTO: 106.6 FL (ref 80–99.9)
MONOCYTES ABSOLUTE: 0.72 E9/L (ref 0.1–0.95)
MONOCYTES ABSOLUTE: 0.77 E9/L (ref 0.1–0.95)
MONOCYTES RELATIVE PERCENT: 5.5 % (ref 2–12)
MONOCYTES RELATIVE PERCENT: 5.8 % (ref 2–12)
MRSA CULTURE ONLY: NORMAL
NEUTROPHILS ABSOLUTE: 10.58 E9/L (ref 1.8–7.3)
NEUTROPHILS ABSOLUTE: 11.35 E9/L (ref 1.8–7.3)
NEUTROPHILS RELATIVE PERCENT: 79.2 % (ref 43–80)
NEUTROPHILS RELATIVE PERCENT: 85.9 % (ref 43–80)
PDW BLD-RTO: 17.2 FL (ref 11.5–15)
PDW BLD-RTO: 17.4 FL (ref 11.5–15)
PLATELET # BLD: 101 E9/L (ref 130–450)
PLATELET # BLD: 110 E9/L (ref 130–450)
PMV BLD AUTO: 10.5 FL (ref 7–12)
PMV BLD AUTO: 10.9 FL (ref 7–12)
POTASSIUM REFLEX MAGNESIUM: 5 MMOL/L (ref 3.5–5)
POTASSIUM SERPL-SCNC: 5 MMOL/L (ref 3.5–5)
RBC # BLD: 3.79 E12/L (ref 3.8–5.8)
RBC # BLD: 4.01 E12/L (ref 3.8–5.8)
SODIUM BLD-SCNC: 139 MMOL/L (ref 132–146)
SODIUM BLD-SCNC: 139 MMOL/L (ref 132–146)
STREP PNEUMONIAE ANTIGEN, URINE: NORMAL
TOTAL PROTEIN: 5.2 G/DL (ref 6.4–8.3)
TOTAL PROTEIN: 5.8 G/DL (ref 6.4–8.3)
WBC # BLD: 13.2 E9/L (ref 4.5–11.5)
WBC # BLD: 13.4 E9/L (ref 4.5–11.5)

## 2022-01-01 PROCEDURE — 6360000002 HC RX W HCPCS

## 2022-01-01 PROCEDURE — 92950 HEART/LUNG RESUSCITATION CPR: CPT

## 2022-01-01 PROCEDURE — 6360000002 HC RX W HCPCS: Performed by: INTERNAL MEDICINE

## 2022-01-01 PROCEDURE — 83605 ASSAY OF LACTIC ACID: CPT

## 2022-01-01 PROCEDURE — 2580000003 HC RX 258

## 2022-01-01 PROCEDURE — 80048 BASIC METABOLIC PNL TOTAL CA: CPT

## 2022-01-01 PROCEDURE — 71275 CT ANGIOGRAPHY CHEST: CPT

## 2022-01-01 PROCEDURE — 36415 COLL VENOUS BLD VENIPUNCTURE: CPT

## 2022-01-01 PROCEDURE — 85378 FIBRIN DEGRADE SEMIQUANT: CPT

## 2022-01-01 PROCEDURE — 80053 COMPREHEN METABOLIC PANEL: CPT

## 2022-01-01 PROCEDURE — 87449 NOS EACH ORGANISM AG IA: CPT

## 2022-01-01 PROCEDURE — 93005 ELECTROCARDIOGRAM TRACING: CPT | Performed by: FAMILY MEDICINE

## 2022-01-01 PROCEDURE — 85730 THROMBOPLASTIN TIME PARTIAL: CPT

## 2022-01-01 PROCEDURE — 2500000003 HC RX 250 WO HCPCS: Performed by: INTERNAL MEDICINE

## 2022-01-01 PROCEDURE — 99222 1ST HOSP IP/OBS MODERATE 55: CPT | Performed by: INTERNAL MEDICINE

## 2022-01-01 PROCEDURE — 85025 COMPLETE CBC W/AUTO DIFF WBC: CPT

## 2022-01-01 PROCEDURE — 2500000003 HC RX 250 WO HCPCS

## 2022-01-01 PROCEDURE — 2580000003 HC RX 258: Performed by: FAMILY MEDICINE

## 2022-01-01 PROCEDURE — 6360000004 HC RX CONTRAST MEDICATION: Performed by: RADIOLOGY

## 2022-01-01 PROCEDURE — 80076 HEPATIC FUNCTION PANEL: CPT

## 2022-01-01 PROCEDURE — 6360000002 HC RX W HCPCS: Performed by: FAMILY MEDICINE

## 2022-01-01 PROCEDURE — 70450 CT HEAD/BRAIN W/O DYE: CPT

## 2022-01-01 PROCEDURE — 1200000000 HC SEMI PRIVATE

## 2022-01-01 PROCEDURE — 87081 CULTURE SCREEN ONLY: CPT

## 2022-01-01 PROCEDURE — 87088 URINE BACTERIA CULTURE: CPT

## 2022-01-01 PROCEDURE — 31500 INSERT EMERGENCY AIRWAY: CPT

## 2022-01-01 PROCEDURE — 2580000003 HC RX 258: Performed by: RADIOLOGY

## 2022-01-01 RX ORDER — ACETAMINOPHEN 325 MG/1
650 TABLET ORAL EVERY 6 HOURS PRN
Status: DISCONTINUED | OUTPATIENT
Start: 2022-01-01 | End: 2022-01-01 | Stop reason: HOSPADM

## 2022-01-01 RX ORDER — POLYETHYLENE GLYCOL 3350 17 G/17G
17 POWDER, FOR SOLUTION ORAL DAILY PRN
Status: DISCONTINUED | OUTPATIENT
Start: 2022-01-01 | End: 2022-01-01 | Stop reason: HOSPADM

## 2022-01-01 RX ORDER — EPINEPHRINE 0.1 MG/ML
SYRINGE (ML) INJECTION
Status: COMPLETED | OUTPATIENT
Start: 2022-01-01 | End: 2022-01-01

## 2022-01-01 RX ORDER — METOPROLOL TARTRATE 5 MG/5ML
5 INJECTION INTRAVENOUS EVERY 6 HOURS PRN
Status: DISCONTINUED | OUTPATIENT
Start: 2022-01-01 | End: 2022-01-01 | Stop reason: HOSPADM

## 2022-01-01 RX ORDER — HEPARIN SODIUM 1000 [USP'U]/ML
80 INJECTION, SOLUTION INTRAVENOUS; SUBCUTANEOUS PRN
Status: DISCONTINUED | OUTPATIENT
Start: 2022-01-01 | End: 2022-01-01 | Stop reason: HOSPADM

## 2022-01-01 RX ORDER — ACETAMINOPHEN 650 MG/1
650 SUPPOSITORY RECTAL EVERY 6 HOURS PRN
Status: DISCONTINUED | OUTPATIENT
Start: 2022-01-01 | End: 2022-01-01 | Stop reason: HOSPADM

## 2022-01-01 RX ORDER — ONDANSETRON 4 MG/1
4 TABLET, ORALLY DISINTEGRATING ORAL EVERY 8 HOURS PRN
Status: DISCONTINUED | OUTPATIENT
Start: 2022-01-01 | End: 2022-01-01 | Stop reason: HOSPADM

## 2022-01-01 RX ORDER — SODIUM CHLORIDE 0.9 % (FLUSH) 0.9 %
5-40 SYRINGE (ML) INJECTION PRN
Status: DISCONTINUED | OUTPATIENT
Start: 2022-01-01 | End: 2022-01-01 | Stop reason: HOSPADM

## 2022-01-01 RX ORDER — TC 99M MEDRONATE 20 MG/10ML
26.6 INJECTION, POWDER, LYOPHILIZED, FOR SOLUTION INTRAVENOUS
Status: DISCONTINUED | OUTPATIENT
Start: 2022-01-01 | End: 2022-01-01 | Stop reason: HOSPADM

## 2022-01-01 RX ORDER — ENOXAPARIN SODIUM 100 MG/ML
40 INJECTION SUBCUTANEOUS DAILY
Status: DISCONTINUED | OUTPATIENT
Start: 2022-01-01 | End: 2022-01-01

## 2022-01-01 RX ORDER — SODIUM CHLORIDE 9 MG/ML
INJECTION, SOLUTION INTRAVENOUS PRN
Status: DISCONTINUED | OUTPATIENT
Start: 2022-01-01 | End: 2022-01-01 | Stop reason: HOSPADM

## 2022-01-01 RX ORDER — HEPARIN SODIUM 10000 [USP'U]/100ML
5-30 INJECTION, SOLUTION INTRAVENOUS CONTINUOUS
Status: DISCONTINUED | OUTPATIENT
Start: 2022-01-01 | End: 2022-01-01 | Stop reason: HOSPADM

## 2022-01-01 RX ORDER — ONDANSETRON 2 MG/ML
4 INJECTION INTRAMUSCULAR; INTRAVENOUS EVERY 6 HOURS PRN
Status: DISCONTINUED | OUTPATIENT
Start: 2022-01-01 | End: 2022-01-01 | Stop reason: HOSPADM

## 2022-01-01 RX ORDER — 0.9 % SODIUM CHLORIDE 0.9 %
500 INTRAVENOUS SOLUTION INTRAVENOUS ONCE
Status: COMPLETED | OUTPATIENT
Start: 2022-01-01 | End: 2022-01-01

## 2022-01-01 RX ORDER — HEPARIN SODIUM 1000 [USP'U]/ML
40 INJECTION, SOLUTION INTRAVENOUS; SUBCUTANEOUS PRN
Status: DISCONTINUED | OUTPATIENT
Start: 2022-01-01 | End: 2022-01-01 | Stop reason: HOSPADM

## 2022-01-01 RX ORDER — CLOPIDOGREL BISULFATE 75 MG/1
75 TABLET ORAL DAILY
COMMUNITY

## 2022-01-01 RX ORDER — HEPARIN SODIUM 1000 [USP'U]/ML
80 INJECTION, SOLUTION INTRAVENOUS; SUBCUTANEOUS ONCE
Status: COMPLETED | OUTPATIENT
Start: 2022-01-01 | End: 2022-01-01

## 2022-01-01 RX ORDER — SODIUM CHLORIDE 0.9 % (FLUSH) 0.9 %
10 SYRINGE (ML) INJECTION PRN
Status: DISCONTINUED | OUTPATIENT
Start: 2022-01-01 | End: 2022-01-01 | Stop reason: HOSPADM

## 2022-01-01 RX ORDER — SODIUM CHLORIDE 0.9 % (FLUSH) 0.9 %
5-40 SYRINGE (ML) INJECTION EVERY 12 HOURS SCHEDULED
Status: DISCONTINUED | OUTPATIENT
Start: 2022-01-01 | End: 2022-01-01 | Stop reason: HOSPADM

## 2022-01-01 RX ORDER — CLOPIDOGREL BISULFATE 75 MG/1
75 TABLET ORAL DAILY
Status: DISCONTINUED | OUTPATIENT
Start: 2022-01-01 | End: 2022-01-01 | Stop reason: HOSPADM

## 2022-01-01 RX ADMIN — SODIUM CHLORIDE, PRESERVATIVE FREE 10 ML: 5 INJECTION INTRAVENOUS at 11:05

## 2022-01-01 RX ADMIN — Medication 1 MG: at 08:49

## 2022-01-01 RX ADMIN — ENOXAPARIN SODIUM 40 MG: 100 INJECTION SUBCUTANEOUS at 11:05

## 2022-01-01 RX ADMIN — HEPARIN SODIUM 5440 UNITS: 1000 INJECTION, SOLUTION INTRAVENOUS; SUBCUTANEOUS at 21:46

## 2022-01-01 RX ADMIN — SODIUM BICARBONATE 50 MEQ: 84 INJECTION, SOLUTION INTRAVENOUS at 08:51

## 2022-01-01 RX ADMIN — Medication 1 MG: at 08:52

## 2022-01-01 RX ADMIN — SODIUM CHLORIDE, PRESERVATIVE FREE 10 ML: 5 INJECTION INTRAVENOUS at 22:09

## 2022-01-01 RX ADMIN — Medication 1 MG: at 08:46

## 2022-01-01 RX ADMIN — SODIUM CHLORIDE 500 ML: 9 INJECTION, SOLUTION INTRAVENOUS at 17:14

## 2022-01-01 RX ADMIN — HEPARIN SODIUM 18 UNITS/KG/HR: 10000 INJECTION, SOLUTION INTRAVENOUS at 22:07

## 2022-01-01 RX ADMIN — Medication 10 ML: at 17:01

## 2022-01-01 RX ADMIN — IOPAMIDOL 60 ML: 755 INJECTION, SOLUTION INTRAVENOUS at 17:01

## 2022-01-01 ASSESSMENT — PAIN SCALES - GENERAL: PAINLEVEL_OUTOF10: 0

## 2022-02-12 ENCOUNTER — HOSPITAL ENCOUNTER (EMERGENCY)
Dept: HOSPITAL 83 - ED | Age: 82
Discharge: HOME | End: 2022-02-12
Payer: COMMERCIAL

## 2022-02-12 VITALS — WEIGHT: 160 LBS | BODY MASS INDEX: 21.91 KG/M2 | HEIGHT: 71.65 IN

## 2022-02-12 DIAGNOSIS — Z79.82: ICD-10-CM
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
## 2022-02-12 DIAGNOSIS — E11.9: ICD-10-CM

## 2022-02-12 DIAGNOSIS — I10: ICD-10-CM
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
## 2022-02-12 DIAGNOSIS — S22.41XA: Primary | ICD-10-CM

## 2022-02-12 DIAGNOSIS — S00.01XA: ICD-10-CM

## 2022-02-12 DIAGNOSIS — Z79.899: ICD-10-CM
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
## 2022-02-12 LAB
ALBUMIN SERPL-MCNC: 3 GM/DL (ref 3.1–4.5)
ALP SERPL-CCNC: 117 U/L (ref 45–117)
ALT SERPL W P-5'-P-CCNC: 37 U/L (ref 12–78)
AST SERPL-CCNC: 39 IU/L (ref 3–35)
BASOPHILS # BLD AUTO: 0.1 10*3/UL (ref 0–0.1)
BASOPHILS NFR BLD AUTO: 0.8 % (ref 0–1)
BUN SERPL-MCNC: 23 MG/DL (ref 7–24)
CHLORIDE SERPL-SCNC: 102 MMOL/L (ref 98–107)
CREAT SERPL-MCNC: 1.05 MG/DL (ref 0.7–1.3)
EOSINOPHIL # BLD AUTO: 0.1 10*3/UL (ref 0–0.4)
EOSINOPHIL # BLD AUTO: 0.8 % (ref 1–4)
ERYTHROCYTE [DISTWIDTH] IN BLOOD BY AUTOMATED COUNT: 12 % (ref 0–14.5)
HCT VFR BLD AUTO: 49.2 % (ref 42–52)
LYMPHOCYTES # BLD AUTO: 1.6 10*3/UL (ref 1.3–4.4)
LYMPHOCYTES NFR BLD AUTO: 15.3 % (ref 27–41)
MCH RBC QN AUTO: 33.5 PG (ref 27–31)
MCHC RBC AUTO-ENTMCNC: 34.1 G/DL (ref 33–37)
MCV RBC AUTO: 98 FL (ref 80–94)
MONOCYTES # BLD AUTO: 0.7 10*3/UL (ref 0.1–1)
MONOCYTES NFR BLD MANUAL: 6.4 % (ref 3–9)
NEUT #: 7.7 10*3/UL (ref 2.3–7.9)
NEUT %: 76.1 % (ref 47–73)
NRBC BLD QL AUTO: 0 % (ref 0–0)
PLATELET # BLD AUTO: 241 10*3/UL (ref 130–400)
PMV BLD AUTO: 10.3 FL (ref 9.6–12.3)
POTASSIUM SERPL-SCNC: 3.9 MMOL/L (ref 3.5–5.1)
PROT SERPL-MCNC: 6.8 GM/DL (ref 6.4–8.2)
RBC # BLD AUTO: 5.02 10*6/UL (ref 4.5–5.9)
SODIUM SERPL-SCNC: 136 MMOL/L (ref 136–145)
WBC NRBC COR # BLD AUTO: 10.2 10*3/UL (ref 4.8–10.8)

## 2022-07-29 ENCOUNTER — HOSPITAL ENCOUNTER (EMERGENCY)
Dept: HOSPITAL 83 - ED | Age: 82
Discharge: LEFT BEFORE DISCHARGE | End: 2022-07-29
Payer: COMMERCIAL

## 2022-07-29 VITALS — BODY MASS INDEX: 15.58 KG/M2 | WEIGHT: 115 LBS | HEIGHT: 71.97 IN

## 2022-07-29 DIAGNOSIS — Z53.29: ICD-10-CM

## 2022-07-29 DIAGNOSIS — R65.20: ICD-10-CM

## 2022-07-29 DIAGNOSIS — J18.9: ICD-10-CM

## 2022-07-29 DIAGNOSIS — Z79.899: ICD-10-CM

## 2022-07-29 DIAGNOSIS — Z79.2: ICD-10-CM

## 2022-07-29 DIAGNOSIS — Z79.82: ICD-10-CM

## 2022-07-29 DIAGNOSIS — K86.9: Primary | ICD-10-CM

## 2022-07-29 LAB
ALP SERPL-CCNC: 419 U/L (ref 45–117)
ALT SERPL W P-5'-P-CCNC: 125 U/L (ref 12–78)
AST SERPL-CCNC: 215 IU/L (ref 3–35)
BACTERIA #/AREA URNS HPF: (no result) /[HPF]
BASOPHILS # BLD AUTO: 0 10*3/UL (ref 0–0.1)
BASOPHILS NFR BLD AUTO: 0.4 % (ref 0–1)
BILIRUB UR QL STRIP: (no result)
BUN SERPL-MCNC: 24 MG/DL (ref 7–24)
CASTS URNS QL MICRO: (no result)
CHLORIDE SERPL-SCNC: 104 MMOL/L (ref 98–107)
CREAT SERPL-MCNC: 1.11 MG/DL (ref 0.7–1.3)
EOSINOPHIL # BLD AUTO: 0 10*3/UL (ref 0–0.4)
EOSINOPHIL # BLD AUTO: 0.2 % (ref 1–4)
EPI CELLS #/AREA URNS HPF: (no result) /[HPF]
ERYTHROCYTE [DISTWIDTH] IN BLOOD BY AUTOMATED COUNT: 16.6 % (ref 0–14.5)
HCT VFR BLD AUTO: 42.6 % (ref 42–52)
INR BLD: 2 (ref 2–3.5)
LEUKOCYTE ESTERASE UR QL STRIP: (no result)
LIPASE SERPL-CCNC: 66 U/L (ref 73–393)
LYMPHOCYTES # BLD AUTO: 1.3 10*3/UL (ref 1.3–4.4)
LYMPHOCYTES NFR BLD AUTO: 12.2 % (ref 27–41)
MCH RBC QN AUTO: 34.9 PG (ref 27–31)
MCHC RBC AUTO-ENTMCNC: 33.3 G/DL (ref 33–37)
MCV RBC AUTO: 104.7 FL (ref 80–94)
MONOCYTES # BLD AUTO: 0.8 10*3/UL (ref 0.1–1)
MONOCYTES NFR BLD MANUAL: 7.9 % (ref 3–9)
MUCOUS THREADS URNS QL MICRO: (no result)
NEUT #: 8.1 10*3/UL (ref 2.3–7.9)
NEUT %: 78.5 % (ref 47–73)
NRBC BLD QL AUTO: 0 % (ref 0–0)
PH UR STRIP: 5.5 [PH] (ref 4.5–8)
PLATELET # BLD AUTO: 109 10*3/UL (ref 130–400)
PMV BLD AUTO: 10.2 FL (ref 9.6–12.3)
POTASSIUM SERPL-SCNC: 4.3 MMOL/L (ref 3.5–5.1)
PROT SERPL-MCNC: 5.3 GM/DL (ref 6.4–8.2)
RBC # BLD AUTO: 4.07 10*6/UL (ref 4.5–5.9)
SODIUM SERPL-SCNC: 134 MMOL/L (ref 136–145)
SP GR UR: 1.02 (ref 1–1.03)
UROBILINOGEN UR STRIP-MCNC: 2 E.U./DL (ref 0–1)
WBC NRBC COR # BLD AUTO: 10.3 10*3/UL (ref 4.8–10.8)

## 2022-07-30 ENCOUNTER — HOSPITAL ENCOUNTER (EMERGENCY)
Dept: HOSPITAL 83 - ED | Age: 82
LOS: 1 days | Discharge: ANOTHER HOSPITAL | End: 2022-07-31
Payer: COMMERCIAL

## 2022-07-30 VITALS — HEIGHT: 70.98 IN | WEIGHT: 150 LBS | BODY MASS INDEX: 21 KG/M2

## 2022-07-30 DIAGNOSIS — Z79.899: ICD-10-CM

## 2022-07-30 DIAGNOSIS — Z79.82: ICD-10-CM

## 2022-07-30 DIAGNOSIS — A41.9: Primary | ICD-10-CM

## 2022-07-30 DIAGNOSIS — N39.0: ICD-10-CM

## 2022-07-30 DIAGNOSIS — J18.9: ICD-10-CM

## 2022-07-30 DIAGNOSIS — K86.9: ICD-10-CM

## 2022-07-30 LAB
ALP SERPL-CCNC: 413 U/L (ref 45–117)
ALT SERPL W P-5'-P-CCNC: 154 U/L (ref 12–78)
APTT PPP: 31.9 SECONDS (ref 20–32.1)
AST SERPL-CCNC: 292 IU/L (ref 3–35)
BASOPHILS # BLD AUTO: 0 10*3/UL (ref 0–0.1)
BASOPHILS NFR BLD AUTO: 0.2 % (ref 0–1)
BUN SERPL-MCNC: 28 MG/DL (ref 7–24)
CHLORIDE SERPL-SCNC: 108 MMOL/L (ref 98–107)
CREAT SERPL-MCNC: 1.14 MG/DL (ref 0.7–1.3)
EOSINOPHIL # BLD AUTO: 0 10*3/UL (ref 0–0.4)
EOSINOPHIL # BLD AUTO: 0.3 % (ref 1–4)
ERYTHROCYTE [DISTWIDTH] IN BLOOD BY AUTOMATED COUNT: 16.9 % (ref 0–14.5)
HCT VFR BLD AUTO: 42.3 % (ref 42–52)
INR BLD: 2 (ref 2–3.5)
LIPASE SERPL-CCNC: 59 U/L (ref 73–393)
LYMPHOCYTES # BLD AUTO: 1.6 10*3/UL (ref 1.3–4.4)
LYMPHOCYTES NFR BLD AUTO: 12.2 % (ref 27–41)
MCH RBC QN AUTO: 35.7 PG (ref 27–31)
MCHC RBC AUTO-ENTMCNC: 33.8 G/DL (ref 33–37)
MCV RBC AUTO: 105.5 FL (ref 80–94)
MONOCYTES # BLD AUTO: 0.9 10*3/UL (ref 0.1–1)
MONOCYTES NFR BLD MANUAL: 6.5 % (ref 3–9)
NEUT #: 10.5 10*3/UL (ref 2.3–7.9)
NEUT %: 80.1 % (ref 47–73)
NRBC BLD QL AUTO: 0 10*3/UL (ref 0–0)
PLATELET # BLD AUTO: 115 10*3/UL (ref 130–400)
PMV BLD AUTO: 10.4 FL (ref 9.6–12.3)
POTASSIUM SERPL-SCNC: 4.8 MMOL/L (ref 3.5–5.1)
PROT SERPL-MCNC: 5.3 GM/DL (ref 6.4–8.2)
RBC # BLD AUTO: 4.01 10*6/UL (ref 4.5–5.9)
SODIUM SERPL-SCNC: 137 MMOL/L (ref 136–145)
WBC NRBC COR # BLD AUTO: 13.1 10*3/UL (ref 4.8–10.8)

## 2022-07-31 PROBLEM — K86.89 PANCREATIC MASS: Status: ACTIVE | Noted: 2022-01-01

## 2022-07-31 PROBLEM — R00.0 SINUS TACHYCARDIA: Status: ACTIVE | Noted: 2022-01-01

## 2022-07-31 PROBLEM — I26.94 MULTIPLE SUBSEGMENTAL PULMONARY EMBOLI WITHOUT ACUTE COR PULMONALE (HCC): Status: ACTIVE | Noted: 2022-01-01

## 2022-07-31 NOTE — PROGRESS NOTES
Dr. Ligia Santizo placed a note in H&P discussing Creat levels. CT department does not base CT eligibility off of creatinine, we base CT eligibility off of GFR results. GFR must be 40 or higher to receive contrast. There are no GFR results from [facility] and there has not been a CMP or BMP from this facility. If Dr. Ligia Santizo would still like to proceed without GFR results the  must place a progress note in pts chart assuming all risks and liability for scanning pt without a known GFR. If any questions please call #[phone].     Thank you    Will monitor for waiver/progress note

## 2022-07-31 NOTE — H&P
Hospital Medicine History & Physical      PCP: No primary care provider on file. Date of Admission: 7/31/2022    Date of Service: Pt seen/examined on 7/31/2022 and Admitted to Inpatient with expected LOS greater than two midnights due to medical therapy. Chief Complaint: Weakness, falls, pancreatic mass      History Of Present Illness:    26-year-old male past medical history of peripheral vascular disease on Plavix presenting from [facility] due to 6 weeks duration of weakness and falls. Also mentioned having a dry cough going on for a long duration. At this [facility] CT chest without contrast was obtained which showed large pleural effusion left greater than right multiple lung nodules with cavitary lesion concerning for mets versus septic emboli. Also showed concern for pneumonia left greater than right lung. CT abdomen without contrast ordered also showed concern for multiple liver lesions with pancreatic mass with abdominal ascites concerning for peritoneal carcinomatosis. Left adrenal metastatic disease. Also showed concern for right femoral neck mets. L2-L3 endplate deformity concerning for metastatic disease. Subcapsular right renal collection. White count of 13.1, hemoglobin 14.3, platelets 545,868, lactic acid 3.1, sodium 134, potassium 4.3, creatinine 1.11, calcium 7.7, bilirubin 3.7, , , high sensitive troponin of 12. Urinalysis with positive nitrites and leukocyte esterase which 2+ bacteria 11-15 white blood cells. He was given Rocephin azithromycin to cover for pneumonia and UTI. Patient denies any family history of cancer or personal history of cancer. He was transferred to [facility] for further care. Past Medical History:      History reviewed. No pertinent past medical history. Past Surgical History:      History reviewed. No pertinent surgical history.     Medications Prior to Admission:      Prior to Admission medications Medication Sig Start Date End Date Taking? Authorizing Provider   clopidogrel (PLAVIX) 75 MG tablet Take 75 mg by mouth in the morning. Yes Historical Provider, MD       Allergies:  Patient has no known allergies. Social History:      The patient currently lives at home with his wife    TOBACCO:   reports that he quit smoking about 32 years ago. His smoking use included cigarettes. He has never used smokeless tobacco.  ETOH:   reports that he does not currently use alcohol. Family History:       Reviewed in detail and negative for DM, CAD, Cancer, CVA. Positive as follows:    History reviewed. No pertinent family history. REVIEW OF SYSTEMS:   Pertinent positives as noted in the HPI. All other systems reviewed and negative. PHYSICAL EXAM:    BP 93/68   Pulse 76   Temp 97.8 °F (36.6 °C) (Oral)   Resp 18   Ht 5' 11\" (1.803 m)   Wt 150 lb (68 kg)   SpO2 100%   BMI 20.92 kg/m²     General appearance:  No apparent distress, appears stated age and cooperative. HEENT:  Normal cephalic, atraumatic without obvious deformity. Pupils equal, round, and reactive to light. Extra ocular muscles intact. Conjunctivae/corneas clear. Neck: Supple, with full range of motion. No jugular venous distention. Trachea midline. Respiratory:  Normal respiratory effort. Diminished diffusely to auscultation, bilaterally without Rales/Wheezes/Rhonchi. Cardiovascular:  Regular rate and rhythm with normal S1/S2 without murmurs, rubs or gallops. Abdomen: Soft, non-tender, non-distended with normal bowel sounds. Musculoskeletal:  No clubbing, cyanosis or edema bilaterally. Full range of motion without deformity. Skin: Skin color, texture, turgor normal.  No rashes or lesions. Neurologic:  Neurovascularly intact without any focal sensory/motor deficits.  Cranial nerves: II-XII intact, grossly non-focal.  Psychiatric:  Alert and oriented, thought content appropriate, normal insight    Imaging from recently performed hospital  CXR:  I have reviewed the CXR with the following interpretation: Bilateral infiltrates lower lung concerning for pneumonia  EKG:  I have reviewed the EKG with the following interpretation: Sinus rhythm low voltage. Labs:     No results for input(s): WBC, HGB, HCT, PLT in the last 72 hours. No results for input(s): NA, K, CL, CO2, BUN, CREATININE, CALCIUM, PHOS in the last 72 hours. Invalid input(s): MAGNES  No results for input(s): AST, ALT, BILIDIR, BILITOT, ALKPHOS in the last 72 hours. No results for input(s): INR in the last 72 hours. No results for input(s): [place] in the last 72 hours. Urinalysis:    No results found for: NITRU, WBCUA, BACTERIA, RBCUA, BLOODU, SPECGRAV, GLUCOSEU      ASSESSMENT:  1.  Bilateral pleural effusion left greater than right  2. Pancreatic mass  3. Lung nodules with cavitary lesion possible mass versus septic emboli  4. Abdominal ascites concerning for peritoneal carcinomatosis  5. Bilateral pneumonia   6. UTI  7. Concern for metastatic disease with liver lesions, L2-L3 endplate deformities, left adrenal mets, right femoral neck metastasis  8. Recurrent falls  9. Weakness  10. Tachycardia probably due to bilateral Pulmonary embolus      PLAN:  1. Patient's presentation is concerning for malignancy. We will check tumor markers with CEA, CA 19-9, AFP and PSA. Consult oncology. General surgery for pancreatic mass. Consult pulmonology for pleural effusion consult with concern for cavitary lesion. Defer thoracentesis to pulmonology. Antibiotics Rocephin and doxycycline. ID consult. Check MRSA PCR and atypicals. PT OT. Follow-up urine culture and blood cultures. Also order CT of the brain given recurrent falls. Hold Plavix in case patient requires a biopsy. PT OT. Of note patient was found to be tachycardic in the 130s -140s on arrival, we will go ahead and check D-dimer. EKG has been ordered to follow-up on result.   Consider CT angiogram of the chest pending D-dimer results. Given elevated D-dimer greater than 5000 and significant tachycardia heart rate greater 140 with risk of PE with underlying possible malignancy we will going to get CT angiogram of the chest.  Creatinine obtained at this [facility] was 1.1. No need to wait for repeat creatinine before proceeding with CT angiogram of the chest.BP on the soft side 84/65. Will give a one time 500cc NS bolus. CTA with bilateral PE. Started on HBW. Tachycardia may be due to PE but EKG with sinus tachycardia and low voltage but unable to tell if strip looked like flutter waves. Will consult EP for input. PRN lopressor for tachycardia. DVT Prophylaxis: Lovenox  Diet: ADULT DIET; Regular  Code Status: Full Code           Tierney Sawant MD    Thank you No primary care provider on file. for the opportunity to be involved in this patient's care. If you have any questions or concerns please feel free to contact me through [facility].

## 2022-07-31 NOTE — CONSULTS
Department of Medicine  Division of Hematology/Oncology  Attending Consult Note      Reason for Consult:  Liver mass, Lung nodules, R femur metastasis  Requesting Physician: Dr. Mitchell Garay:  Generalized weakness     History Obtained From:  Patient, wife and son. AlsoEMR    HISTORY OF PRESENT ILLNESS:      The patient is a 78 y.o. male with significant past medical history of tobacco abuse and PVD on plavix. He had presented to [facility] with generalized weakness for 1 to 2 months along with cough, shortness of breath. Associated with difficulty standing and with ambulation resulting in multiple falls over several weeks. CT chest/abdomen/pelvis 7/29/2022 revealed moderate left larger than right pleural effusion. Scattered cavitary nodules in the lungs bilaterally, right more than left, involving all lobes. Largest nodule in right lung in the RUL 1.6 cm. Innumerable nodules 0.2-0.6 cm. Extensive confluent infiltrative abnormality of the liver. Perihepatic ascites with slightly slight nodularity in the gastrohepatic region suggesting peritoneal carcinomatosis. Possible reflux esophagitis. Mildly distended stomach. Enlargement of the pancreas with a mass of the proximal to distal body measuring 8.4 x 3.1 cm with scattered calcifications consistent with prior pancreatitis. New left adrenal gland nodule measuring 1.6 x 0.8 cm. Subacute subcapsular hematoma in the right kidney 2.1 x 5.7 cm. Right upper pole kidney cyst.  Presumptive metastasis to L2 and L3 and right femoral neck. Healing rib fractures on the right. UA was positive and he was given antibiotics. He was transferred to [facility] for further recommendation. Denies any GI or  blood loss or any easy bruising. Denies any headaches, blurry vision, dizziness, lightheadedness. Denies any chest pain, palpitations, dyspnea with exertion.   Denies any abdominal pain, nausea, vomiting, diarrhea or constipation. Denies any lymph node swellings, lumps, bumps, fevers, chills, night sweats. Has poor appetite and a 20-30 lbs unintentional weight loss. Past Medical History:    History reviewed. No pertinent past medical history. Hx of tobacco abuse  PVD on Plavix     Past Surgical History:    History reviewed. No pertinent surgical history. Current Medications:    Current Facility-Administered Medications: sodium chloride flush 0.9 % injection 5-40 mL, 5-40 mL, IntraVENous, 2 times per day  sodium chloride flush 0.9 % injection 5-40 mL, 5-40 mL, IntraVENous, PRN  0.9 % sodium chloride infusion, , IntraVENous, PRN  enoxaparin (LOVENOX) injection 40 mg, 40 mg, SubCUTAneous, Daily  ondansetron (ZOFRAN-ODT) disintegrating tablet 4 mg, 4 mg, Oral, Q8H PRN **OR** ondansetron (ZOFRAN) injection 4 mg, 4 mg, IntraVENous, Q6H PRN  polyethylene glycol (GLYCOLAX) packet 17 g, 17 g, Oral, Daily PRN  acetaminophen (TYLENOL) tablet 650 mg, 650 mg, Oral, Q6H PRN **OR** acetaminophen (TYLENOL) suppository 650 mg, 650 mg, Rectal, Q6H PRN  [Held by provider] clopidogrel (PLAVIX) tablet 75 mg, 75 mg, Oral, Daily  doxycycline (VIBRAMYCIN) 100 mg in dextrose 5 % 100 mL IVPB (Ulib1Efu), 100 mg, IntraVENous, Q12H  cefTRIAXone (ROCEPHIN) 2,000 mg in sterile water 20 mL IV syringe, 2,000 mg, IntraVENous, Q24H    Allergies:  Patient has no known allergies.     Social History:   Social History     Socioeconomic History    Marital status:      Spouse name: Not on file    Number of children: Not on file    Years of education: Not on file    Highest education level: Not on file   Occupational History    Not on file   Tobacco Use    Smoking status: Former     Types: Cigarettes     Quit date:      Years since quittin.6    Smokeless tobacco: Never   Vaping Use    Vaping Use: Never used   Substance and Sexual Activity    Alcohol use: Not Currently    Drug use: Never    Sexual activity: Not Currently   Other Topics Concern Not on file   Social History Narrative    Not on file     Social Determinants of Health     Financial Resource Strain: Not on file   Food Insecurity: Not on file   Transportation Needs: Not on file   Physical Activity: Not on file   Stress: Not on file   Social Connections: Not on file   Intimate Partner Violence: Not on file   Housing Stability: Not on file       Family History:     History reviewed. No pertinent family history. Hypertension and diabetes mellitus in the family. REVIEW OF SYSTEMS:    As per HPI otherwise 14 point ROS is negative    PHYSICAL EXAM:      Vitals:  BP 93/68   Pulse 76   Temp 97.8 °F (36.6 °C) (Oral)   Resp 18   Ht 5' 11\" (1.803 m)   Wt 150 lb (68 kg)   SpO2 100%   BMI 20.92 kg/m²     CONSTITUTIONAL:  awake, alert, cooperative, no apparent distress, and appears stated age  HEENT:  Normocepalic, atraumatic, no obvious abnormality. Lids and lashes normal, PERRLA, EOMI, sclera clear, conjunctiva normal  NECK:  Supple, full ROM, symmetrical, trachea midline, no adenopathy, thyroid symmetric, not enlarged and no tenderness, skin normal  HEMATOLOGIC/LYMPHATICS:  no cervical or supraclavicular lymphadenopathy  LUNGS:  Decreased BS bilatearlly. No increased work of breathing, good air exchange, no crackles or wheezing  CARDIOVASCULAR:  Normal apical impulse, regular rate and rhythm, normal S1 and S2, no S3 or S4, and no murmur noted  ABDOMEN:  No scars, normal BS, soft, non-distended, non-tender, no masses palpated, no hepatosplenomegaly  MUSCULOSKELETAL:  Scattered bruising. No redness, warmth, or swelling of the joints; tone is normal  NEUROLOGIC:  Awake, alert, oriented to name, place and time. Cranial nerves II-XII are grossly intact.      DATA:      CMP:    Lab Results   Component Value Date/Time     06/16/2018 08:45 AM    K 4.1 06/16/2018 08:45 AM     06/16/2018 08:45 AM    CO2 28 06/16/2018 08:45 AM    BUN 20 06/16/2018 08:45 AM    PROT 7.8 06/16/2018 08:45 AM

## 2022-07-31 NOTE — CONSULTS
[facility] CONSULT NOTE    Reason for Consult: Cavitary lung lesion   Requested by: Mya Bhagat MD     Chief complaint: Work-up for suspected malignancy     History Obtained From: EMR and patient    HISTORY OFPRESENT ILLNESS              The patient is a 78 y.o. male smoker with history of peripheral vascular disease, transferred to [facility] on 07/31 for further evaluation of suspected metastatic malignancy from [facility] where he initially presented with generalized weakness for 1-2 months accompanied by nonproductive cough for 3 weeks, impaired gait and ambulation, multiple falls, with CT chest, abdomen and pelvis showing moderate left pleural effusion, scattered cavitary nodules in the lungs bilaterally (worse on the right) involving all lobes with largest nodule in the RUL measuring 1.6 cm, innumerable nodules 0.2-0.6 cm., extensive confluent infiltrative abnormality of the liver, perihepatic ascites with slight nodularity in the gastrohepatic region suggesting peritoneal carcinomatosis, possible reflux esophagitis, mildly distended stomach, enlargement of the pancreas with a mass of the proximal to distal body measuring 8.4 x 3.1 cm with scattered calcifications consistent with prior pancreatitis, new left adrenal gland nodule measuring 1.6 x 0.8 cm, subacute subcapsular hematoma in the right kidney 2.1 x 5.7 cm, right upper pole kidney cyst, presumptive metastasis to L2 and L3 and right femoral neck, healing rib fractures on the right. He reports no history of travel, no recent/prolonged exposure to live poultry or khushi/construction areas, no history of incarceration or being in homeless shelter. He lives with his wife who has not had similar symptoms. 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 He was evaluated by Heme-Onc and was suspected to have disseminated malignancy, pancreatic mass with metastatic disease involving the liver, lungs, peritoneum and bones.  He was given azithromycin, ceftriaxone and amoxicillin-clavulanate. On transfer, he was afebrile and hemodynamically stable with no evidence of tachypnea and hypoxemia with oxygen saturation of 100% on room air. Ceftriaxone and doxycycline were started. ID service was subsequently consulted for further recommendations. Past Medical History  History reviewed. No pertinent past medical history. Current Facility-Administered Medications   Medication Dose Route Frequency Provider Last Rate Last Admin    sodium chloride flush 0.9 % injection 5-40 mL  5-40 mL IntraVENous 2 times per day Sam Guajardo MD   10 mL at 07/31/22 1105    sodium chloride flush 0.9 % injection 5-40 mL  5-40 mL IntraVENous PRN Sam Guajardo MD        0.9 % sodium chloride infusion   IntraVENous PRN Sam Guajardo MD        enoxaparin (LOVENOX) injection 40 mg  40 mg SubCUTAneous Daily Sam Guajardo MD   40 mg at 07/31/22 1105    ondansetron (ZOFRAN-ODT) disintegrating tablet 4 mg  4 mg Oral Q8H PRN Sam Guajardo MD        Or    ondansetron (ZOFRAN) injection 4 mg  4 mg IntraVENous Q6H PRN Sam Guajardo MD        polyethylene glycol (GLYCOLAX) packet 17 g  17 g Oral Daily PRN Sam Guajardo MD        acetaminophen (TYLENOL) tablet 650 mg  650 mg Oral Q6H PRN Sam Guajardo MD        Or    acetaminophen (TYLENOL) suppository 650 mg  650 mg Rectal Q6H PRN Sam Guajardo MD        [Held by provider] clopidogrel (PLAVIX) tablet 75 mg  75 mg Oral Daily Sam Guajardo MD        doxycycline (VIBRAMYCIN) 100 mg in dextrose 5 % 100 mL IVPB ([id number])  100 mg IntraVENous Q12H Sam Guajardo MD        cefTRIAXone (ROCEPHIN) 2,000 mg in sterile water 20 mL IV syringe  2,000 mg IntraVENous Q24H Sam Guajardo MD           No Known Allergies    Surgical History  History reviewed. No pertinent surgical history.      Social History  Social History     Socioeconomic History    Marital status:    Tobacco Use Smoking status: Former     Types: Cigarettes     Quit date:      Years since quittin.6    Smokeless tobacco: Never   Vaping Use    Vaping Use: Never used   Substance and Sexual Activity    Alcohol use: Not Currently    Drug use: Never         Family Medical History  History reviewed. No pertinent family history. Review of Systems:  Constitutional: No fever, no chills  Eyes: No vision changes, no retroorbital pain  ENT: No hearing changes, no ear pain  Respiratory: Has cough, no dyspnea  Cardiovascular: No chest pain, no palpitations  Gastrointestinal: No abdominal pain, no diarrhea  Genitourinary: No dysuria, no hematuria  Integumentary: No rash, no itching  Musculoskeletal: No muscle pain, no joint pain  Neurologic: No headache, no numbness in extremities    Physical Examination:  Vitals:    22 0438 22 0729   BP: 90/70 93/68   Pulse: 78 76   Resp: 18 18   Temp: 97.6 °F (36.4 °C) 97.8 °F (36.6 °C)   TempSrc: Temporal Oral   SpO2: 98% 100%   Weight: 150 lb (68 kg)    Height: 5' 11\" (1.803 m)      Constitutional: Alert, not in distress  Eyes: Sclerae anicteric, no conjunctival erythema  ENT: No buccal lesion, no pharyngeal exudates  Neck: No nuchal rigidity, no cervical adenopathy  Lungs: Clear breath sounds, no crackles, no wheezes  Heart: Regular rate and rhythm, no murmurs  Abdomen: Bowel sounds present, soft, nontender  Skin: Warm and dry, no active dermatoses  Musculoskeletal: No joint erythema, no joint swelling    Labs, imaging, and medical records/notes were personally reviewed. Assessment:  Lung lesions, suspect associated with metastatic malignancy from suspected primary pancreatic malignancy with no clinical evidence of pneumonia. Plan:  Stop antibiotics. Monitor clinically off antibiotics for now. Follow up urine Streptococcus pneumoniae and Legionella antigens.   Follow up blood cultures on  from [facility]  Follow up Heme-Onc and Pulmonology

## 2022-07-31 NOTE — CONSULTS
Cheryl Marti M.D.,[facility]  Kareem Magaña D.O., F.KENNETH.ELAINA.OJJ., Carl Bruce M.D. Ferdinand Singer M.D. Skip Bishop D.O. Patient:  Sebastián Perez 78 y.o. male MRN: [id number]     Date of Service: 7/31/2022    PULMONARY CONSULTATION    Reason for Consultation: Lung nodules, cavitary lesion, pleural effusion  Referring Physician: Dr. David Madera with the referring physician will be sent via the electronic medical record. Chief Complaint: shortness of breath, weakness    CODE STATUS: full code    SUBJECTIVE:  HPI:  Sebastián Perez is a 78 y.o. male patient not previously known to our service. He is a poor historian, and history is largely gathered from his medical chart. This patient presented initially on 7/31 from [facility] after approximately 6 weeks of increasing weakness and falls. He also had shortness of breath and a dry cough. CT chest without contrast was obtained at [facility] and it showed a large left pleural effusion greater than the right with multiple lung nodules and cavitary lesions concerning for metastasis versus septic emboli. There is also some concern for pneumonia on the left side of the lung. There is also some concern for multiple liver lesions with pancreatic mass and abdominal ascites concerning for peritoneal carcinomatosis. There is also evidence of left adrenal metastatic disease, right femoral neck metastasis, L2-L3 endplate deformity. Urinalysis had positive nitrites and leukocyte esterase. He has no previous history of cancer, and was transferred to [facility] for further care and management. He was seen and examined lying in bed in no apparent distress. He is currently on room air and says that he is somewhat short of breath. He does have a cough, weakness, and decreased appetite. He is a past smoker. Pedal edema is evident, lung sounds are diminished, and his skin is very pale.   He is currently 100% on room air but has been running hypotensive. There is no past medical history on file  There is no past surgical history on file  There is no family history on file    Social History:   Social History     Socioeconomic History    Marital status:      Spouse name: Not on file    Number of children: Not on file    Years of education: Not on file    Highest education level: Not on file   Occupational History    Not on file   Tobacco Use    Smoking status: Former     Types: Cigarettes     Quit date:      Years since quittin.6    Smokeless tobacco: Never   Vaping Use    Vaping Use: Never used   Substance and Sexual Activity    Alcohol use: Not Currently    Drug use: Never    Sexual activity: Not Currently   Other Topics Concern    Not on file   Social History Narrative    Not on file     Social Determinants of Health     Financial Resource Strain: Not on file   Food Insecurity: Not on file   Transportation Needs: Not on file   Physical Activity: Not on file   Stress: Not on file   Social Connections: Not on file   Intimate Partner Violence: Not on file   Housing Stability: Not on file     Smoking history: The patient is a Past smoker  ETOH:   reports that he does not currently use alcohol. Exposures: There  is not history of TB or TB exposure. There is not asbestos or silica dust exposure. The patient reports does not have coal, foundry, quarry or [employer] exposure. Recent travel history none. There is not  history of recreational or IV drug use. There is not hot tub exposure. The patient does not have any exotic pets, turtles or exotic birds. Vaccines:    Influenza: Indicated for current flu vaccination season Oct. to Feb.  Pneumococcal Polysaccharide:  Indicated for current flu vaccination season Oct. to Feb.    There is no immunization history on file for this patient.      Home Meds: Medications Prior to Admission: clopidogrel (PLAVIX) 75 MG tablet, Take 75 mg by mouth in the morning. CURRENT MEDS :  Scheduled Meds:   sodium chloride flush  5-40 mL IntraVENous 2 times per day    enoxaparin  40 mg SubCUTAneous Daily    [Held by provider] clopidogrel  75 mg Oral Daily    sodium chloride  500 mL IntraVENous Once       Continuous Infusions:   sodium chloride         No Known Allergies    REVIEW OF SYSTEMS:  Constitutional: Denies fever, weight loss, night sweats, and fatigue  Skin: Denies pigmentation, dark lesions, and rashes   HEENT: Denies hearing loss, tinnitus, ear drainage, epistaxis, sore throat, and hoarseness. Cardiovascular: Denies palpitations, chest pain, and chest pressure. Respiratory: + Cough, + dyspnea at rest, denies hemoptysis, apnea, and choking. Gastrointestinal: Denies nausea, vomiting, + poor appetite, denies diarrhea, heartburn or reflux  Genitourinary: Denies dysuria, frequency, urgency or hematuria  Musculoskeletal: Denies myalgias, + muscle weakness, and denies bone pain  Neurological: Denies dizziness, vertigo, headache, and focal weakness  Psychological: Denies anxiety and depression  Endocrine: Denies heat intolerance and cold intolerance  Hematopoietic/Lymphatic: Denies bleeding problems and blood transfusions    OBJECTIVE:   BP 84/63   Pulse (!) 166   Temp 97.8 °F (36.6 °C) (Oral)   Resp 17   Ht 5' 11\" (1.803 m)   Wt 150 lb (68 kg)   SpO2 94%   BMI 20.92 kg/m²   SpO2 Readings from Last 1 Encounters:   07/31/22 94%        I/O:  No intake or output data in the 24 hours ending 07/31/22 4007         Physical Exam:  General: The patient is lying in bed comfortably without any distress. Breathing is not labored  HEENT: Pupils are equal round and reactive to light, there are no oral lesions and no post-nasal drip   Neck: supple without adenopathy  Cardiovascular: regular rate and rhythm without murmur or gallop  Respiratory: Clear to auscultation bilaterally without wheezing or crackles, diminished.   Air entry is symmetric  Abdomen: soft, non-tender, non-distended, normal bowel sounds  Extremities: warm,  no clubbing. Bilateral +1 pedal edema  Skin: no rash or lesion  Neurologic: CN II-XII grossly intact, no focal deficits    Pulmonary Function Testing personally reviewed and interpreted  None on file    Imaging personally reviewed[facility] reviewed, disc and radiology for download    Echo:  None on file    Labs:  Lab Results   Component Value Date/Time    WBC 8.1 06/16/2018 08:45 AM    HGB 15.3 06/16/2018 08:45 AM    HCT 47.0 06/16/2018 08:45 AM    .2 06/16/2018 08:45 AM    MCH 33.3 06/16/2018 08:45 AM    MCHC 32.6 06/16/2018 08:45 AM    RDW 12.8 06/16/2018 08:45 AM     06/16/2018 08:45 AM    MPV 10.4 06/16/2018 08:45 AM     Lab Results   Component Value Date/Time     06/16/2018 08:45 AM    K 4.1 06/16/2018 08:45 AM     06/16/2018 08:45 AM    CO2 28 06/16/2018 08:45 AM    BUN 20 06/16/2018 08:45 AM    CREATININE 0.95 06/16/2018 08:45 AM    LABALBU 3.7 06/16/2018 08:45 AM    CALCIUM 8.9 06/16/2018 08:45 AM    GFRAA > 60 06/16/2018 08:45 AM    LABGLOM >60 06/16/2018 08:45 AM     No results found for: PROTIME, INR  No results for input(s): PROBNP in the last 72 hours. No results for input(s): TROPONINI in the last 72 hours. No results for input(s): PROCAL in the last 72 hours. This SmartLink has not been configured with any valid records. Micro:  No results for input(s): CULTRESP in the last 72 hours. No results for input(s): LABGRAM in the last 72 hours. No results for input(s): LEGUR in the last 72 hours. No results for input(s): STREPNEUMAGU in the last 72 hours. No results for input(s): LP1UAG in the last 72 hours.       Assessment:  Bilateral pleural effusions left greater than right  Multiple cavitary lesions  Multiple lung nodules versus septic emboli  Possible bilateral pneumonia  Pancreatic mass  Abdominal ascites concerning for peritoneal carcinomatosis  UTI  Concern for metastatic disease with liver lesions, L2-L3 endplate deformities, left adrenal mets, right femoral neck metastasis  Recurrent falls  Weakness  Tachycardia    Plan:  Plan for thoracentesis on the left, Plavix currently on hold  Antibiotics per ID  General surgery consulted for pancreatic mass   CT of the brain  CT angiogram pending  Disc from [facility] [employer]. reviewed  Bone scan pending      Thank you for allowing me to participate in the care of Robert Lewis. Please feel free to call with questions. This plan of care was reviewed in collaboration with Dr. Miranda Gutierrez    Electronically signed by EVANS Jimenez CNP on 7/31/2022 at 3:57 PM      Note: This report was completed utilizing computer voice recognition software. Every effort has been made to ensure accuracy, however; inadvertent computerized transcription errors may be present    I personally saw, examined, and cared for the patient. Labs, medications, radiographs reviewed. I agree with history exam and plans detailed in NP note. CT imaging reviewed from [facility]. Repeat CTA. Patient to go for liver biopsy. Plavix held. Would benefit from left thoracentesis. Palliative care.     Champ Jasso, DO

## 2022-08-01 NOTE — PROGRESS NOTES
Intubation Record    Date: [id number]    Time: 2231  Patient identity confirmed: Yes  Indications: code blue   Preoxygenation: BVM with 100% O2   Laryngoscope size and type: glidescope  Airway introducer used: Yes  Evac: Yes  Tube size: 7.5  Number of attempts :1  Cords visualized:  [x]Clearly [] Poorly   Breath sounds present bilaterally: Yes  ETCO2 32  ETT to lip: 24  Tube secured with: vijay  Chest x-ray ordered: Yes  Difficulties encountered: Copious amounts of brown emesis while intubating       Difficult Airway: NO          Hien Robert RCP, RRT

## 2022-08-01 NOTE — SIGNIFICANT EVENT
Code blue note    Code blue was called at 8:43 am.    On arrival, pt was asystole    ACLS protocol was initiated and the following was given:  Adrenaline: 3   Atropine:  -  Calcium gluconate:  -  Sodium bicarbonate: 1     Patient intubated and placed on ventilator. Central line insertion attempted per team members. ABG attempted, unable to be obtained     We continued CPR. Primary contacted family while CPR was continued. They elected to stop the code and make the patient comfortable. stopped at [address]    Family, PCP and Intensives were updated.     Patient was pronounced dead at [address] [facility], , PGY-3  Internal Medicine resident  8/1/2022  10:51 AM

## 2022-08-01 NOTE — DISCHARGE SUMMARY
Hospital Medicine Discharge Summary    Patient ID: Margarita Click      Patient's PCP: No primary care provider on file. Admitting Physician: Cyril Hart MD  Discharge Physician: EVANS Donohue CNP     Admit Date: 2022     Disposition:     Discharge Diagnoses:   Principal Problem:    Pancreatic mass  Active Problems:    Sinus tachycardia    Multiple subsegmental pulmonary emboli without acute cor pulmonale (HCC)  Resolved Problems:    * No resolved hospital problems. *      Date of Death: 22   Time of DW:4371    Immediate Cause of Death: Cardiac arrest  Underlying Conditions:Presumptive metastatic pancreatic cancer, pancreatic mass, bilateral pleural effusions, innumerable lung nodules, cavitary lung mass, probable peritoneal carcinomatosis, L2 and L3 bone mets, right femoral neck bone mets      Hospital Course:   Mr. Bulmaro Abdi is an 49-year-old male with no reported PMH. He initially presented from [facility] with complaints of weakness and falls x6 weeks as well as a dry cough x >6 months. CT of the chest revealed a moderate left pleural effusion and mild right pleural effusion, scattered cavitary nodules in the lungs bilaterally, right more than left, involving all lobes. Also extensive confluent infiltrative abnormality of the liver and perihepatic ascites with slight nodularity in the gastrohepatic region suggesting peritoneal carcinomatosis. Also with enlargement of the pancreas with a mass of the proximal to distal body with scattered calcifications consistent with prior pancreatitis, a left adrenal nodule, a subacute subscapular hematoma of the right kidney, right kidney cyst, presumptive metastasis to L2 and L3 as well as the right femoral neck. His UA was also positive, he was given antibiotic coverage for presumptive pneumonia as well as UTI prior to being transferred to [address] [place] [address] on 2022 for further evaluation and treatment.     He was admitted and consultations to oncology, infectious disease and pulmonary were placed. There was plan for additional imaging, CT-guided liver biopsy, bone scan, CNS imaging, thoracentesis and blood work to confirm a diagnosis of metastatic pancreatic cancer. On admission he was noted to be tachycardic in the 130s to 140s, D-dimer was grossly positive and CTA of the chest was obtained which revealed bilateral pulmonary emboli. He was started on heparin drip and EP was consulted for sinus tachycardia. On the morning of  he suffered cardiac arrest.  CODE BLUE was called, ACLS was performed including intubation. I personally called his daughter and wife and discussed his status with them. They elected to stop the code and make the patient comfortable. He  at 18.    Family chose [facility] in [address] [place] [address]:  [place] [id number]  IP CONSULT TO GENERAL SURGERY  IP CONSULT TO PULMONOLOGY  IP CONSULT TO INFECTIOUS DISEASES  IP CONSULT TO ELECTROPHYSIOLOGY  IP CONSULT TO PULMONOLOGY    Signed:  EVANS Lentz - MAYELA  2022

## 2022-08-01 NOTE — PROGRESS NOTES
I spoke with RN on floor. In preparation for IR procedure patient will need to be npo, a pt/inr resulted, heparin drip on hold for 4 hours. RN states patient has not received plavix doses at all. She states he does not take it at home and did not receive it at [facility].

## 2022-08-01 NOTE — CONSULTS
[address] and [address] Electrophysiology  Consultation Report  PATIENT: Mariely Lind  MEDICAL RECORD NUMBER: [id number]  DATE OF SERVICE:  2022  ATTENDING ELECTROPHYSIOLOGIST: Zeb Zacarias MD  PRIMARY ELECTROPHYSIOLOGIST: None  REFERRING PHYSICIAN: Russ Isaacs MD   CHIEF COMPLAINT: Weakness,    HPI: This is a 78 y.o. male who is a poor historian therefore the history is obtained mainly from his medical records from [facility] and here. He has a history of peripheral vascular disease and presented to [facility] with weakness on 2022. The patient says that he has  been unable to ambulate for the last several weeks. He may have also had several falls. Work-up at [facility] included a CT of the chest and abdomen which revealed bilateral nodules in his lungs involving all the lobes, bilateral pleural effusions as well as extensive infiltrative abnormality of the liver. Enlargement the pancreas with a mass of the proximal to distal body was also noted. Other lesions in the bones suggestive of metastatic disease led to his transfer. The patient denies any previous cardiac history, however on presentation to the hospital his EKG did show a rapid rhythm and therefore electrophysiology consultation was obtained for \"tachycardia\". The patient denies any prior history of chest pain, or shortness of breath at rest.  No syncope or near syncope in the past  No symptoms of paroxysmal nocturnal dyspnea, orthopnea or leg edema  No previous cardiac work-up available for review    History reviewed. No pertinent family history.     Social History     Tobacco Use    Smoking status: Former     Types: Cigarettes     Quit date:      Years since quittin.6    Smokeless tobacco: Never   Substance Use Topics    Alcohol use: Not Currently       Current Facility-Administered Medications   Medication Dose Route Frequency Provider Last Rate Last Admin    sodium chloride flush 0.9 % injection 5-40 mL  5-40 mL IntraVENous 2 times per day Lalitha Mahajan MD   10 mL at 07/31/22 1105    sodium chloride flush 0.9 % injection 5-40 mL  5-40 mL IntraVENous PRN Lalitha Mahajan MD        0.9 % sodium chloride infusion   IntraVENous PRN Lalitha Mahajan MD        ondansetron (ZOFRAN-ODT) disintegrating tablet 4 mg  4 mg Oral Q8H PRN Lalitha Mahajan MD        Or    ondansetron (ZOFRAN) injection 4 mg  4 mg IntraVENous Q6H PRN Lalitha Mahajan MD        polyethylene glycol (GLYCOLAX) packet 17 g  17 g Oral Daily PRN Lalitha Mahajan MD        acetaminophen (TYLENOL) tablet 650 mg  650 mg Oral Q6H PRN Lalitha Mahajan MD        Or    acetaminophen (TYLENOL) suppository 650 mg  650 mg Rectal Q6H PRN Lalitha Mahajan MD        [Held by provider] clopidogrel (PLAVIX) tablet 75 mg  75 mg Oral Daily Lalitha Mahajan MD        metoprolol (LOPRESSOR) injection 5 mg  5 mg IntraVENous Q6H PRN Lalitha Mahajan MD        sodium chloride flush 0.9 % injection 10 mL  10 mL IntraVENous PRN Parth Flood, DO   10 mL at 89/27/97 1701    heparin (porcine) injection 5,440 Units  80 Units/kg IntraVENous Once Lalitha Mahajan MD        heparin (porcine) injection 5,440 Units  80 Units/kg IntraVENous PRN Lalitha Mahajan MD        heparin (porcine) injection 2,720 Units  40 Units/kg IntraVENous PRN Lalitha Mahajan MD        heparin 25,000 units in dextrose 5% 250 mL (premix) infusion  5-30 Units/kg/hr IntraVENous Continuous Lalitha Mahajan MD            No Known Allergies    ROS: Unable to obtain accurately since the patient is a poor historian  Nevertheless  Constitutional: Positive for fever, activity change and appetite change. HENT: Negative for epistaxis. Eyes: Negative for diploplia, blurred vision. Respiratory: Negative for cough, chest tightness, shortness of breath and wheezing.    Cardiovascular: pertinent positives in HPI  Gastrointestinal: Negative for abdominal pain and blood in stool. PHYSICAL EXAM:   Vitals:    07/31/22 0729 07/31/22 1445 07/31/22 1547 07/31/22 1550   BP: 93/68 84/63 (!) 170/77 89/61   Pulse: 76 (!) 166 63 63   Resp: 18 17     Temp: 97.8 °F (36.6 °C)      TempSrc: Oral      SpO2: 100% 94% 99% 99%   Weight:       Height:          Constitutional: Well-developed, no acute distress  Eyes: conjunctivae normal, no xanthelasma   Ears, Nose, Throat: oral mucosa moist, no cyanosis   CV: no JVD, no leg edema, laterally displaced PMI with a normal S1 and S2 with left sternal border and apex  Lungs: Reduced breath sounds bilaterally normal respiratory effort without used of accessory muscles  Abdomen: soft, non-distended  Musculoskeletal: Bilateral bruises on his upper and lower extremities noted  Skin: warm, bilateral bruises    I have personally reviewed the laboratory, cardiac diagnostic and radiographic testing as outlined below:    Data:    Recent Labs     07/31/22  1603   WBC 13.4*   HGB 14.2   HCT 42.2   *     Recent Labs     07/31/22  1604      K 5.0      CO2 21*   BUN 34*   CREATININE 1.1   CALCIUM 8.4*      No results found for: MG  No results for input(s): TSH in the last 72 hours. No results for input(s): INR in the last 72 hours. Telemetry: Currently sinus rhythm 80 to 90 bpm    EKG: Sinus tachycardia, low voltage in chest and limb leads    CTA of the chest:    Impression   1. Positive examination for bilateral pulmonary emboli. 2. Moderate bilateral pleural effusions larger on the left. 3. Ground-glass and nodular opacities throughout both lungs notable in the   lung bases. Some of these nodular opacities contain central cavitation. Findings could suggest lung metastases or septic emboli. 4. Peribronchial inflammatory changes bilaterally notable in perihilar   locations.    5. Smooth interlobular septal thickening bilaterally which could suggest   interstitial pulmonary edema. 6. View of the upper abdomen shows heterogeneous appearance of the liver   related to multiple focal areas of fatty infiltration or liver metastases. I have independently reviewed all of the ECGs and rhythm strips per above     Assessment/Plan:     Sinus tachycardia on presentation secondary to multiple medical issues  Including pulmonary embolism, bilateral pulmonary infiltrates, possible pneumonia    2. Pancreatic mass  Extensive metastatic disease, work-up in progress    3. Clinical cardiac exam is unremarkable  Will however obtain echocardiography      Recommendations:    Systemic anticoagulation for pulmonary embolism  Treatment of other medical problems as is ongoing  No specific work-up or intervention from needed from electrophysiology standpoint      I have spent a total of 50 minutes with the patient  reviewing the above stated recommendations. And a total of >50% of that time involved face-to-face time providing counseling and or coordination of care with the other providers, reviewing records/tests, counseling/education of the patient, ordering medications/tests/procedures, coordinating care, and documenting clinical information in the EHR. Thank you for allowing me to participate in your patient's care. Please call me if there are any questions or concerns.       Phil Blake [facility]  Cardiac Electrophysiology  [facility]) Physicians  [facility]: [facility] Electrophysiology  8:11 PM  7/31/2022

## 2022-08-01 NOTE — PROGRESS NOTES
Patient assessed at start of shift. Patient alert but confused. CMU called RN on unit, not writer to advise patient condition. Code blue called. During code, family contacted via phone. Family made decision to stop code and allow patient to pass. Patient passed.

## 2022-08-02 LAB — URINE CULTURE, ROUTINE: NORMAL

## 2022-10-13 NOTE — NUR
PHYSICAL THERAPY
 
Patient seen this am 1;1 for therapy visit and was resting supine in bed with
his Wife present upon therapist arrival. Patient identified by name /  and
reports no new c/o's at this time. Patient transfers supine to sit EOB CGA and
educated on NWB status precautions for L LE. Patient completed several sit to
stand transfers with use of wh walker standing support, MIN A, then ambulates
15'x 1, CGA, wh walker, demonstrating "bunny hop" gait pattern. Patient
needed v/c to improve walker safety / navigation with shorter stride and
returned to supine in bed following all treatment. Patient remained in bed
with call light, tray table, telephone and bed alarm. Will continue per POC as
tolerated, total treatment time 14 minutes.  Aung Guzman, PTA Unknown if ever smoked